# Patient Record
Sex: FEMALE | Race: WHITE | NOT HISPANIC OR LATINO | Employment: UNEMPLOYED | URBAN - METROPOLITAN AREA
[De-identification: names, ages, dates, MRNs, and addresses within clinical notes are randomized per-mention and may not be internally consistent; named-entity substitution may affect disease eponyms.]

---
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
## 2021-07-01 DIAGNOSIS — Z30.45 ENCOUNTER FOR SURVEILLANCE OF TRANSDERMAL PATCH HORMONAL CONTRACEPTIVE DEVICE: ICD-10-CM

## 2021-07-26 ENCOUNTER — OFFICE VISIT (OUTPATIENT)
Dept: URGENT CARE | Facility: CLINIC | Age: 21
End: 2021-07-26
Payer: COMMERCIAL

## 2021-07-26 VITALS
SYSTOLIC BLOOD PRESSURE: 104 MMHG | WEIGHT: 130 LBS | RESPIRATION RATE: 16 BRPM | TEMPERATURE: 97.3 F | HEART RATE: 92 BPM | DIASTOLIC BLOOD PRESSURE: 72 MMHG | BODY MASS INDEX: 23.03 KG/M2 | OXYGEN SATURATION: 97 %

## 2021-07-26 DIAGNOSIS — R11.11 NON-INTRACTABLE VOMITING WITHOUT NAUSEA, UNSPECIFIED VOMITING TYPE: Primary | ICD-10-CM

## 2021-07-26 PROCEDURE — 99214 OFFICE O/P EST MOD 30 MIN: CPT | Performed by: PHYSICIAN ASSISTANT

## 2021-07-26 PROCEDURE — S9088 SERVICES PROVIDED IN URGENT: HCPCS | Performed by: PHYSICIAN ASSISTANT

## 2021-07-26 RX ORDER — ONDANSETRON 4 MG/1
4 TABLET, ORALLY DISINTEGRATING ORAL EVERY 6 HOURS PRN
Qty: 20 TABLET | Refills: 0 | Status: SHIPPED | OUTPATIENT
Start: 2021-07-26 | End: 2022-03-29

## 2021-07-26 RX ORDER — DESVENLAFAXINE 100 MG/1
100 TABLET, EXTENDED RELEASE ORAL DAILY
COMMUNITY
Start: 2021-07-08 | End: 2022-02-14

## 2021-07-26 NOTE — PROGRESS NOTES
[facility]        NAME: Abbey Monsalve is a 22 y o  female  : 2000    MRN: [id number]  DATE: 2021  TIME: 4:11 PM    Assessment and Plan   Non-intractable vomiting without nausea, unspecified vomiting type [R11 11]  1  Non-intractable vomiting without nausea, unspecified vomiting type  ondansetron (ZOFRAN-ODT) 4 mg disintegrating tablet         Patient Instructions   Patient Instructions   zofran as needed   clear fluids as tolerated   Acute Nausea and Vomiting   WHAT YOU NEED TO KNOW:   Acute nausea and vomiting start suddenly, worsen quickly, and last a short time  DISCHARGE INSTRUCTIONS:   Return to the emergency department if:   · You see blood in your vomit or your bowel movements  · You have sudden, severe pain in your chest and upper abdomen after hard vomiting or retching  · You have swelling in your neck and chest      · You are dizzy, cold, and thirsty and your eyes and mouth are dry  · You are urinating very little or not at all  · You have muscle weakness, leg cramps, and trouble breathing  · Your heart is beating much faster than normal      · You continue to vomit for more than 48 hours  Contact your healthcare provider if:   · You have frequent dry heaves (vomiting but nothing comes out)  · Your nausea and vomiting does not get better or go away after you use medicine  · You have questions or concerns about your condition or treatment  Medicines: You may need any of the following:  · Medicines  may be given to calm your stomach and stop your vomiting  You may also need medicines to help you feel more relaxed or to stop nausea and vomiting caused by motion sickness  · Gastrointestinal stimulants  are used to help empty your stomach and bowels  This may help decrease nausea and vomiting  · Take your medicine as directed  Contact your healthcare provider if you think your medicine is not helping or if you have side effects   Tell him or her if you are allergic to any medicine  Keep a list of the medicines, vitamins, and herbs you take  Include the amounts, and when and why you take them  Bring the list or the pill bottles to follow-up visits  Carry your medicine list with you in case of an emergency  Prevent or manage acute nausea and vomiting:   · Do not drink alcohol  Alcohol may upset or irritate your stomach  Too much alcohol can also cause acute nausea and vomiting  · Control stress  Headaches due to stress may cause nausea and vomiting  Find ways to relax and manage your stress  Get more rest and sleep  · Drink more liquids as directed  Vomiting can lead to dehydration  It is important to drink more liquids to help replace lost body fluids  Ask your healthcare provider how much liquid to drink each day and which liquids are best for you  Your provider may recommend that you drink an oral rehydration solution (ORS)  ORS contains water, salts, and sugar that are needed to replace the lost body fluids  Ask what kind of ORS to use, how much to drink, and where to get it  · Eat smaller meals, more often  Eat small amounts of food every 2 to 3 hours, even if you are not hungry  Food in your stomach may decrease your nausea  · Talk to your healthcare provider before you take over-the-counter (OTC) medicines  These medicines can cause serious problems if you use certain other medicines, or you have a medical condition  You may have problems if you use too much or use them for longer than the label says  Follow directions on the label carefully  Follow up with your healthcare provider as directed:  Write down your questions so you remember to ask them during your follow-up visits  © Copyright [employer] 2021 Information is for End User's use only and may not be sold, redistributed or otherwise used for commercial purposes   All illustrations and images included in CareNotes® are the copyrighted property of [employer]  or [employer] [facility]  The above information is an  only  It is not intended as medical advice for individual conditions or treatments  Talk to your doctor, nurse or pharmacist before following any medical regimen to see if it is safe and effective for you  Follow up with PCP in 3-5 days  Proceed to  ER if symptoms worsen  Chief Complaint     Chief Complaint   Patient presents with    Vomiting     started last night after dinner, about 6 episodes  Also c/o headache  No diarrhea         History of Present Illness       The patient is a 20-year-old female presenting with vomiting  It began last night after dinner  She has vomited 3 times  Nonbloody  Last episode was at 9 this morning  She also has a headache  No diarrhea  Mild nausea  She believes it was the pizza rolls she ate  After the pizza rolls she ate chicken with her whole family and nobody else is sick  Review of Systems   Review of Systems   Constitutional: Negative for activity change, appetite change, chills, fatigue and fever  HENT: Negative for congestion, rhinorrhea, sinus pressure, sinus pain and sore throat  Respiratory: Negative for cough, chest tightness and shortness of breath  Cardiovascular: Negative for chest pain and palpitations  Gastrointestinal: Positive for nausea and vomiting  Negative for abdominal distention, abdominal pain, anal bleeding, blood in stool, constipation and diarrhea  Musculoskeletal: Negative for arthralgias and myalgias  Skin: Negative for color change and pallor  Neurological: Positive for headaches           Current Medications       Current Outpatient Medications:     desvenlafaxine (PRISTIQ) 100 mg 24 hr tablet, Take 100 mg by mouth daily, Disp: , Rfl:     norelgestromin-ethinyl estradiol (ORTHO EVRA) 150-35 MCG/24HR, Place 1 patch on the skin once a week, Disp: 3 patch, Rfl: 10    albuterol (VENTOLIN HFA) 90 mcg/act inhaler, Inhale 2 puffs every 4 (four) hours as needed for wheezing (Patient not taking: Reported on 2019), Disp: 1 Inhaler, Rfl: 0    ALPRAZolam (XANAX) 0 25 mg tablet, Take by mouth daily at bedtime as needed for anxiety (Patient not taking: Reported on 2021), Disp: , Rfl:     desvenlafaxine succinate (PRISTIQ) 50 mg 24 hr tablet, Take 1 tablet (50 mg total) by mouth daily, Disp: 30 tablet, Rfl:     etonogestrel (Nexplanon) subdermal implant, 68 mg by Subdermal route once (Patient not taking: Reported on 2021), Disp: , Rfl:     hydrOXYzine HCL (ATARAX) 50 mg tablet, Take 50 mg by mouth 3 (three) times a day as needed for itching (PRN with panic attack), Disp: , Rfl:     norelgestromin-ethinyl estradiol (ORTHO EVRA) 150-35 MCG/24HR, Place 1 patch on the skin once a week, Disp: 3 patch, Rfl: 3    ondansetron (ZOFRAN-ODT) 4 mg disintegrating tablet, Take 1 tablet (4 mg total) by mouth every 6 (six) hours as needed for nausea or vomiting, Disp: 20 tablet, Rfl: 0    Current Allergies     Allergies as of 2021 - Reviewed 2021   Allergen Reaction Noted    Amoxicillin-pot clavulanate GI Intolerance 2018    Molds & smuts  2014    Pollen extract  2014            The following portions of the patient's history were reviewed and updated as appropriate: allergies, current medications, past family history, past medical history, past social history, past surgical history and problem list      Past Medical History:   Diagnosis Date    Anxiety     Depression     Scoliosis     Trichotillomania        Past Surgical History:   Procedure Laterality Date    NO PAST SURGERIES      THERAPEUTIC       Non Surgery- Pill Form       Family History   Problem Relation Age of Onset    Mental illness Mother     Diabetes Father     No Known Problems Sister     Hypertension Maternal Grandmother     Alcohol abuse Maternal Grandfather     Mental illness Paternal Grandmother     Mental illness Paternal Grandfather Medications have been verified  Objective   /72   Pulse 92   Temp (!) 97 3 °F (36 3 °C) (Temporal)   Resp 16   Wt 59 kg (130 lb)   LMP 07/25/2021   SpO2 97%   BMI 23 03 kg/m²        Physical Exam     Physical Exam  Vitals reviewed  Constitutional:       General: She is not in acute distress  Appearance: Normal appearance  She is normal weight  She is not ill-appearing, toxic-appearing or diaphoretic  HENT:      Head: Normocephalic and atraumatic  Cardiovascular:      Rate and Rhythm: Normal rate and regular rhythm  Heart sounds: Normal heart sounds  No murmur heard  No friction rub  No gallop  Pulmonary:      Effort: Pulmonary effort is normal  No respiratory distress  Breath sounds: Normal breath sounds  No stridor  No wheezing, rhonchi or rales  Chest:      Chest wall: No tenderness  Abdominal:      General: Abdomen is flat  Bowel sounds are normal  There is no distension  Palpations: Abdomen is soft  There is no mass  Tenderness: There is no abdominal tenderness  There is no right CVA tenderness, left CVA tenderness, guarding or rebound  Hernia: No hernia is present  Musculoskeletal:         General: Normal range of motion  Skin:     General: Skin is warm and dry  Capillary Refill: Capillary refill takes less than 2 seconds  Neurological:      Mental Status: She is alert

## 2021-07-26 NOTE — LETTER
July 26, 2021     Patient: Jami Stewart   YOB: 2000   Date of Visit: 7/26/2021       To Whom it May Concern:    Omar Goodwin is under my professional care  She was seen in my office on 7/26/2021  She may return to work 7/27/2021  If you have any questions or concerns, please don't hesitate to call           Sincerely,          Kianna Acevedo PA-C        CC: No Recipients

## 2021-07-26 NOTE — PATIENT INSTRUCTIONS
zofran as needed   clear fluids as tolerated   Acute Nausea and Vomiting   WHAT YOU NEED TO KNOW:   Acute nausea and vomiting start suddenly, worsen quickly, and last a short time  DISCHARGE INSTRUCTIONS:   Return to the emergency department if:   · You see blood in your vomit or your bowel movements  · You have sudden, severe pain in your chest and upper abdomen after hard vomiting or retching  · You have swelling in your neck and chest      · You are dizzy, cold, and thirsty and your eyes and mouth are dry  · You are urinating very little or not at all  · You have muscle weakness, leg cramps, and trouble breathing  · Your heart is beating much faster than normal      · You continue to vomit for more than 48 hours  Contact your healthcare provider if:   · You have frequent dry heaves (vomiting but nothing comes out)  · Your nausea and vomiting does not get better or go away after you use medicine  · You have questions or concerns about your condition or treatment  Medicines: You may need any of the following:  · Medicines  may be given to calm your stomach and stop your vomiting  You may also need medicines to help you feel more relaxed or to stop nausea and vomiting caused by motion sickness  · Gastrointestinal stimulants  are used to help empty your stomach and bowels  This may help decrease nausea and vomiting  · Take your medicine as directed  Contact your healthcare provider if you think your medicine is not helping or if you have side effects  Tell him or her if you are allergic to any medicine  Keep a list of the medicines, vitamins, and herbs you take  Include the amounts, and when and why you take them  Bring the list or the pill bottles to follow-up visits  Carry your medicine list with you in case of an emergency  Prevent or manage acute nausea and vomiting:   · Do not drink alcohol  Alcohol may upset or irritate your stomach   Too much alcohol can also cause acute nausea and vomiting  · Control stress  Headaches due to stress may cause nausea and vomiting  Find ways to relax and manage your stress  Get more rest and sleep  · Drink more liquids as directed  Vomiting can lead to dehydration  It is important to drink more liquids to help replace lost body fluids  Ask your healthcare provider how much liquid to drink each day and which liquids are best for you  Your provider may recommend that you drink an oral rehydration solution (ORS)  ORS contains water, salts, and sugar that are needed to replace the lost body fluids  Ask what kind of ORS to use, how much to drink, and where to get it  · Eat smaller meals, more often  Eat small amounts of food every 2 to 3 hours, even if you are not hungry  Food in your stomach may decrease your nausea  · Talk to your healthcare provider before you take over-the-counter (OTC) medicines  These medicines can cause serious problems if you use certain other medicines, or you have a medical condition  You may have problems if you use too much or use them for longer than the label says  Follow directions on the label carefully  Follow up with your healthcare provider as directed:  Write down your questions so you remember to ask them during your follow-up visits  © Copyright [employer] 2021 Information is for End User's use only and may not be sold, redistributed or otherwise used for commercial purposes  All illustrations and images included in CareNotes® are the copyrighted property of [employer]  or Wil[employer] Ren[employer]  The above information is an  only  It is not intended as medical advice for individual conditions or treatments  Talk to your doctor, nurse or pharmacist before following any medical regimen to see if it is safe and effective for you

## 2021-08-27 ENCOUNTER — OFFICE VISIT (OUTPATIENT)
Dept: FAMILY MEDICINE CLINIC | Facility: CLINIC | Age: 21
End: 2021-08-27
Payer: COMMERCIAL

## 2021-08-27 VITALS
OXYGEN SATURATION: 99 % | BODY MASS INDEX: 23.74 KG/M2 | TEMPERATURE: 97.8 F | WEIGHT: 129 LBS | HEIGHT: 62 IN | SYSTOLIC BLOOD PRESSURE: 104 MMHG | DIASTOLIC BLOOD PRESSURE: 78 MMHG | HEART RATE: 104 BPM

## 2021-08-27 DIAGNOSIS — F39 MOOD DISORDER (HCC): ICD-10-CM

## 2021-08-27 DIAGNOSIS — Z00.00 HEALTHCARE MAINTENANCE: Primary | ICD-10-CM

## 2021-08-27 DIAGNOSIS — F32.A DEPRESSION, UNSPECIFIED DEPRESSION TYPE: ICD-10-CM

## 2021-08-27 PROCEDURE — 1036F TOBACCO NON-USER: CPT | Performed by: PHYSICIAN ASSISTANT

## 2021-08-27 PROCEDURE — 99385 PREV VISIT NEW AGE 18-39: CPT | Performed by: PHYSICIAN ASSISTANT

## 2021-08-27 PROCEDURE — 3008F BODY MASS INDEX DOCD: CPT | Performed by: PHYSICIAN ASSISTANT

## 2021-08-27 PROCEDURE — 3725F SCREEN DEPRESSION PERFORMED: CPT | Performed by: PHYSICIAN ASSISTANT

## 2021-08-27 RX ORDER — ESCITALOPRAM OXALATE 10 MG/1
10 TABLET ORAL DAILY
COMMUNITY
End: 2022-02-14

## 2021-08-27 NOTE — PROGRESS NOTES
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[place] [address]   Pt is being managed by psych at [facility] taking pristiq and lexapro for depression, mood disorder and PTSD        Immunizations and preventive care screenings were discussed with patient today  Appropriate education was printed on patient's after visit summary  Counseling:  · Exercise: the importance of regular exercise/physical activity was discussed  Recommend exercise 3-5 times per week for at least 30 minutes  Return in 1 year (on 2022)  Chief Complaint:     Chief Complaint   Patient presents with    Establish Care     physical for nursing school      History of Present Illness:     Adult Annual Physical   Patient here for a comprehensive physical exam  The patient reports no problems  Diet and Physical Activity  · Diet/Nutrition: well balanced diet  · Exercise: no formal exercise  Depression Screening  PHQ-9 Depression Screening    PHQ-9:   Frequency of the following problems over the past two weeks:      Little interest or pleasure in doing things: 0 - not at all  Feeling down, depressed, or hopeless: 0 - not at all  PHQ-2 Score: 0       General Health  · Sleep: sleeps well  · Hearing: normal - bilateral   · Vision: no vision problems  · Dental: regular dental visits         /GYN Health  · Last menstrual period: 1 week ago  On OCP  Has not had cervical screening     Review of Systems:     Review of Systems   Constitutional: Negative for chills, fatigue and fever  HENT: Negative for congestion, ear pain, sinus pain, sore throat and trouble swallowing  Eyes: Negative for pain, discharge and redness  Respiratory: Negative for cough, chest tightness, shortness of breath and wheezing  Cardiovascular: Negative for chest pain, palpitations and leg swelling  Gastrointestinal: Negative for abdominal pain, diarrhea, nausea and vomiting  Musculoskeletal: Negative for arthralgias, joint swelling and myalgias  Skin: Negative for rash  Neurological: Negative for dizziness, weakness, numbness and headaches        Past Medical History:     Past Medical History:   Diagnosis Date    Anxiety     Depression     Scoliosis     Trichotillomania       Past Surgical History:     Past Surgical History:   Procedure Laterality Date    NO PAST SURGERIES      THERAPEUTIC       Non Surgery- Pill Form      Social History:     Social History     Socioeconomic History    Marital status: Single     Spouse name: None    Number of children: None    Years of education: None    Highest education level: None   Occupational History    None   Tobacco Use    Smoking status: Passive Smoke Exposure - Never Smoker    Smokeless tobacco: Never Used   Vaping Use    Vaping Use: Never used   Substance and Sexual Activity    Alcohol use: No    Drug use: No    Sexual activity: Yes     Partners: Female, Male     Birth control/protection: Condom Male     Comment: male currently    Other Topics Concern    None   Social History Narrative    Pt doesn't know her cell phone number so you may contact mom      Social Determinants of Health     Financial Resource Strain:     Difficulty of Paying Living Expenses:    Food Insecurity:     Worried About Running Out of Food in the Last Year:     Ran Out of Food in the Last Year:    Transportation Needs:     Lack of Transportation (Medical):      Lack of Transportation (Non-Medical):    Physical Activity:     Days of Exercise per Week:     Minutes of Exercise per Session:    Stress:     Feeling of Stress :    Social Connections:     Frequency of Communication with Friends and Family:     Frequency of Social Gatherings with Friends and Family:     Attends Hinduism Services:     Active Member of Clubs or Organizations:     Attends Club or Organization Meetings:     Marital Status:    Intimate Partner Violence:     Fear of Current or Ex-Partner:     Emotionally Abused:     Physically Abused:     Sexually Abused:       Family History:     Family History   Problem Relation Age of Onset    Mental illness Mother     Diabetes Father     No Known Problems Sister     Hypertension Maternal Grandmother     Alcohol abuse Maternal Grandfather     Mental illness Paternal Grandmother     Mental illness Paternal Grandfather       Current Medications:     Current Outpatient Medications   Medication Sig Dispense Refill    desvenlafaxine succinate (PRISTIQ) 50 mg 24 hr tablet Take 1 tablet (50 mg total) by mouth daily 30 tablet     escitalopram (LEXAPRO) 10 mg tablet Take 10 mg by mouth daily      norelgestromin-ethinyl estradiol (ORTHO EVRA) 150-35 MCG/24HR Place 1 patch on the skin once a week 3 patch 10    albuterol (VENTOLIN HFA) 90 mcg/act inhaler Inhale 2 puffs every 4 (four) hours as needed for wheezing (Patient not taking: Reported on 5/24/2019) 1 Inhaler 0    ALPRAZolam (XANAX) 0 25 mg tablet Take by mouth daily at bedtime as needed for anxiety (Patient not taking: Reported on 7/26/2021)      desvenlafaxine (PRISTIQ) 100 mg 24 hr tablet Take 100 mg by mouth daily (Patient not taking: Reported on 8/27/2021)      etonogestrel (Nexplanon) subdermal implant 68 mg by Subdermal route once (Patient not taking: Reported on 7/26/2021)      hydrOXYzine HCL (ATARAX) 50 mg tablet Take 50 mg by mouth 3 (three) times a day as needed for itching (PRN with panic attack) (Patient not taking: Reported on 8/27/2021)      norelgestromin-ethinyl estradiol (ORTHO EVRA) 150-35 MCG/24HR Place 1 patch on the skin once a week (Patient not taking: Reported on 8/27/2021) 3 patch 3    ondansetron (ZOFRAN-ODT) 4 mg disintegrating tablet Take 1 tablet (4 mg total) by mouth every 6 (six) hours as needed for nausea or vomiting (Patient not taking: Reported on 8/27/2021) 20 tablet 0     No current facility-administered medications for this visit  Allergies: Allergies   Allergen Reactions    Amoxicillin-Pot Clavulanate GI Intolerance    Fruit & Vegetable Daily [Fish Oil - Food Allergy] GI Intolerance     Grape fruit    Molds & Smuts     Pollen Extract       Physical Exam:     /78 (BP Location: Left arm, Patient Position: Sitting, Cuff Size: Large)   Pulse 104   Temp 97 8 °F (36 6 °C)   Ht 5' 2" (1 575 m)   Wt 58 5 kg (129 lb)   LMP 08/16/2021   SpO2 99%   BMI 23 59 kg/m²     Physical Exam  Vitals and nursing note reviewed  Constitutional:       General: She is not in acute distress  Appearance: She is well-developed  HENT:      Head: Normocephalic and atraumatic  Eyes:      Conjunctiva/sclera: Conjunctivae normal    Cardiovascular:      Rate and Rhythm: Normal rate and regular rhythm  Heart sounds: No murmur heard  Pulmonary:      Effort: Pulmonary effort is normal  No respiratory distress  Breath sounds: Normal breath sounds  Abdominal:      Palpations: Abdomen is soft  Tenderness: There is no abdominal tenderness  Musculoskeletal:      Cervical back: Neck supple  Skin:     General: Skin is warm and dry  Neurological:      Mental Status: She is alert            Emily Griffin PA-C   [address] [place] [address] [phone]

## 2021-08-27 NOTE — PATIENT INSTRUCTIONS

## 2021-09-02 ENCOUNTER — VBI (OUTPATIENT)
Dept: ADMINISTRATIVE | Facility: OTHER | Age: 21
End: 2021-09-02

## 2021-09-16 ENCOUNTER — TELEPHONE (OUTPATIENT)
Dept: OTHER | Facility: OTHER | Age: 21
End: 2021-09-16

## 2021-09-20 ENCOUNTER — CLINICAL SUPPORT (OUTPATIENT)
Dept: FAMILY MEDICINE CLINIC | Facility: CLINIC | Age: 21
End: 2021-09-20
Payer: COMMERCIAL

## 2021-09-20 ENCOUNTER — APPOINTMENT (OUTPATIENT)
Dept: LAB | Facility: CLINIC | Age: 21
End: 2021-09-20
Payer: COMMERCIAL

## 2021-09-20 ENCOUNTER — TELEPHONE (OUTPATIENT)
Dept: FAMILY MEDICINE CLINIC | Facility: CLINIC | Age: 21
End: 2021-09-20

## 2021-09-20 DIAGNOSIS — Z01.84 ENCOUNTER FOR ANTIBODY RESPONSE EXAMINATION: ICD-10-CM

## 2021-09-20 DIAGNOSIS — Z11.59 NEED FOR HEPATITIS B SCREENING TEST: ICD-10-CM

## 2021-09-20 DIAGNOSIS — Z11.1 PPD SCREENING TEST: Primary | ICD-10-CM

## 2021-09-20 LAB
HBV SURFACE AB SER-ACNC: 31.78 MIU/ML
RUBV IGG SERPL IA-ACNC: 159.3 IU/ML

## 2021-09-20 PROCEDURE — 86787 VARICELLA-ZOSTER ANTIBODY: CPT

## 2021-09-20 PROCEDURE — 36415 COLL VENOUS BLD VENIPUNCTURE: CPT

## 2021-09-20 PROCEDURE — 86580 TB INTRADERMAL TEST: CPT

## 2021-09-20 PROCEDURE — 86762 RUBELLA ANTIBODY: CPT

## 2021-09-20 PROCEDURE — 86706 HEP B SURFACE ANTIBODY: CPT

## 2021-09-20 PROCEDURE — 86735 MUMPS ANTIBODY: CPT

## 2021-09-20 PROCEDURE — 86765 RUBEOLA ANTIBODY: CPT
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
## 2021-09-20 NOTE — TELEPHONE ENCOUNTER
She has no complaints of pain that I am aware of, she was here for her PPD and then needed a bunch of titers ordered and then also asked for the note as well

## 2021-09-21 LAB — VZV IGG SER IA-ACNC: ABNORMAL

## 2021-09-22 ENCOUNTER — OFFICE VISIT (OUTPATIENT)
Dept: FAMILY MEDICINE CLINIC | Facility: CLINIC | Age: 21
End: 2021-09-22
Payer: COMMERCIAL

## 2021-09-22 VITALS
DIASTOLIC BLOOD PRESSURE: 72 MMHG | WEIGHT: 129 LBS | HEART RATE: 112 BPM | SYSTOLIC BLOOD PRESSURE: 116 MMHG | HEIGHT: 62 IN | RESPIRATION RATE: 18 BRPM | BODY MASS INDEX: 23.74 KG/M2 | OXYGEN SATURATION: 99 %

## 2021-09-22 DIAGNOSIS — M79.605 LEFT LEG PAIN: Primary | ICD-10-CM

## 2021-09-22 LAB
INDURATION: 0 MM
TB SKIN TEST: NEGATIVE

## 2021-09-22 PROCEDURE — 99213 OFFICE O/P EST LOW 20 MIN: CPT | Performed by: PHYSICIAN ASSISTANT

## 2021-09-22 PROCEDURE — 3008F BODY MASS INDEX DOCD: CPT | Performed by: PHYSICIAN ASSISTANT

## 2021-09-22 PROCEDURE — 1036F TOBACCO NON-USER: CPT | Performed by: PHYSICIAN ASSISTANT

## 2021-09-22 NOTE — LETTER
September 22, 2021     Patient: Analy Harmon   YOB: 2000   Date of Visit: 9/22/2021       To Whom it May Concern:    Keyana Vieira is under my professional care  She was seen in my office on 9/22/2021  Please excuse patient 9/18, 9/19 and 9/20  Patient may return to work  If you have any questions or concerns, please don't hesitate to call           Sincerely,          Heena Griffin PA-C        CC: No Recipients
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
## 2021-09-23 LAB
MEV IGG SER QL: NORMAL
MUV IGG SER QL: NORMAL

## 2021-09-27 ENCOUNTER — CLINICAL SUPPORT (OUTPATIENT)
Dept: FAMILY MEDICINE CLINIC | Facility: CLINIC | Age: 21
End: 2021-09-27
Payer: COMMERCIAL

## 2021-09-27 DIAGNOSIS — Z11.1 SCREENING FOR TUBERCULOSIS: Primary | ICD-10-CM

## 2021-09-27 PROCEDURE — 86580 TB INTRADERMAL TEST: CPT

## 2021-09-29 LAB
INDURATION: 0 MM
TB SKIN TEST: NEGATIVE

## 2021-10-22 ENCOUNTER — VBI (OUTPATIENT)
Dept: ADMINISTRATIVE | Facility: OTHER | Age: 21
End: 2021-10-22

## 2021-10-25 ENCOUNTER — CLINICAL SUPPORT (OUTPATIENT)
Dept: FAMILY MEDICINE CLINIC | Facility: CLINIC | Age: 21
End: 2021-10-25
Payer: COMMERCIAL

## 2021-10-25 DIAGNOSIS — Z23 NEED FOR VARICELLA VACCINE: Primary | ICD-10-CM

## 2021-10-25 PROCEDURE — 90471 IMMUNIZATION ADMIN: CPT

## 2021-10-25 PROCEDURE — 90716 VAR VACCINE LIVE SUBQ: CPT

## 2021-11-16 DIAGNOSIS — Z30.45 ENCOUNTER FOR SURVEILLANCE OF TRANSDERMAL PATCH HORMONAL CONTRACEPTIVE DEVICE: Primary | ICD-10-CM

## 2021-11-16 RX ORDER — NORELGESTROMIN AND ETHINYL ESTRADIOL 150; 35 UG/D; UG/D
PATCH TRANSDERMAL
Qty: 3 PATCH | Refills: 3 | Status: SHIPPED | OUTPATIENT
Start: 2021-11-16 | End: 2022-03-10

## 2021-12-22 ENCOUNTER — TELEPHONE (OUTPATIENT)
Dept: OBGYN CLINIC | Facility: CLINIC | Age: 21
End: 2021-12-22

## 2022-02-09 ENCOUNTER — TELEPHONE (OUTPATIENT)
Dept: FAMILY MEDICINE CLINIC | Facility: CLINIC | Age: 22
End: 2022-02-09

## 2022-02-09 NOTE — TELEPHONE ENCOUNTER
Patient called states she only has one pill left of her prystiq prescription  Her psychiatrist no longer takes her insurance and she is wondering if it can be filled this one time till she can find a provider

## 2022-02-14 NOTE — TELEPHONE ENCOUNTER
Mili with [facility] and she takes Prestiq 100 mg once a day to [employer] on [facility]

## 2022-03-29 ENCOUNTER — OFFICE VISIT (OUTPATIENT)
Dept: FAMILY MEDICINE CLINIC | Facility: CLINIC | Age: 22
End: 2022-03-29
Payer: COMMERCIAL

## 2022-03-29 VITALS
TEMPERATURE: 97.3 F | SYSTOLIC BLOOD PRESSURE: 116 MMHG | OXYGEN SATURATION: 97 % | HEIGHT: 62 IN | WEIGHT: 137 LBS | HEART RATE: 102 BPM | BODY MASS INDEX: 25.21 KG/M2 | DIASTOLIC BLOOD PRESSURE: 70 MMHG

## 2022-03-29 DIAGNOSIS — R11.2 NAUSEA AND VOMITING, INTRACTABILITY OF VOMITING NOT SPECIFIED, UNSPECIFIED VOMITING TYPE: ICD-10-CM
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
## 2022-03-29 DIAGNOSIS — R50.9 FEVER, UNSPECIFIED FEVER CAUSE: Primary | ICD-10-CM

## 2022-03-29 PROCEDURE — 1036F TOBACCO NON-USER: CPT | Performed by: PHYSICIAN ASSISTANT

## 2022-03-29 PROCEDURE — 99214 OFFICE O/P EST MOD 30 MIN: CPT | Performed by: PHYSICIAN ASSISTANT

## 2022-03-29 PROCEDURE — 87636 SARSCOV2 & INF A&B AMP PRB: CPT | Performed by: PHYSICIAN ASSISTANT

## 2022-03-29 PROCEDURE — 3008F BODY MASS INDEX DOCD: CPT | Performed by: PHYSICIAN ASSISTANT

## 2022-03-29 RX ORDER — ONDANSETRON 4 MG/1
4 TABLET, FILM COATED ORAL EVERY 8 HOURS PRN
Qty: 20 TABLET | Refills: 0 | Status: SHIPPED | OUTPATIENT
Start: 2022-03-29

## 2022-03-29 RX ORDER — DOXYCYCLINE 100 MG/1
100 TABLET ORAL 2 TIMES DAILY
Qty: 14 TABLET | Refills: 0 | Status: SHIPPED | OUTPATIENT
Start: 2022-03-29 | End: 2022-04-05

## 2022-03-29 NOTE — LETTER
March 29, 2022     Patient: Romina Goodman   YOB: 2000   Date of Visit: 3/29/2022       To Whom it May Concern:    Gaurav Bustos is under my professional care  She was seen in my office on 3/29/2022  If you have any questions or concerns, please don't hesitate to call           Sincerely,          Corazon Griffin PA-C        CC: No Recipients
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
## 2022-03-30 LAB
FLUAV RNA RESP QL NAA+PROBE: NEGATIVE
FLUBV RNA RESP QL NAA+PROBE: NEGATIVE
SARS-COV-2 RNA RESP QL NAA+PROBE: NEGATIVE

## 2022-04-01 ENCOUNTER — TELEPHONE (OUTPATIENT)
Dept: OTHER | Facility: OTHER | Age: 22
End: 2022-04-01

## 2022-04-01 ENCOUNTER — OFFICE VISIT (OUTPATIENT)
Dept: OBGYN CLINIC | Facility: CLINIC | Age: 22
End: 2022-04-01
Payer: COMMERCIAL

## 2022-04-01 VITALS
SYSTOLIC BLOOD PRESSURE: 116 MMHG | DIASTOLIC BLOOD PRESSURE: 70 MMHG | BODY MASS INDEX: 25.28 KG/M2 | WEIGHT: 137.4 LBS | HEIGHT: 62 IN

## 2022-04-01 DIAGNOSIS — N76.0 ACUTE VAGINITIS: Primary | ICD-10-CM

## 2022-04-01 DIAGNOSIS — Z11.3 SCREENING EXAMINATION FOR STD (SEXUALLY TRANSMITTED DISEASE): ICD-10-CM

## 2022-04-01 DIAGNOSIS — Z11.3 SCREENING EXAMINATION FOR VENEREAL DISEASE: ICD-10-CM

## 2022-04-01 PROCEDURE — 87480 CANDIDA DNA DIR PROBE: CPT | Performed by: PHYSICIAN ASSISTANT

## 2022-04-01 PROCEDURE — 87660 TRICHOMONAS VAGIN DIR PROBE: CPT | Performed by: PHYSICIAN ASSISTANT

## 2022-04-01 PROCEDURE — 87591 N.GONORRHOEAE DNA AMP PROB: CPT | Performed by: PHYSICIAN ASSISTANT

## 2022-04-01 PROCEDURE — 1036F TOBACCO NON-USER: CPT | Performed by: PHYSICIAN ASSISTANT

## 2022-04-01 PROCEDURE — 87510 GARDNER VAG DNA DIR PROBE: CPT | Performed by: PHYSICIAN ASSISTANT

## 2022-04-01 PROCEDURE — 99213 OFFICE O/P EST LOW 20 MIN: CPT | Performed by: PHYSICIAN ASSISTANT

## 2022-04-01 PROCEDURE — 3008F BODY MASS INDEX DOCD: CPT | Performed by: PHYSICIAN ASSISTANT

## 2022-04-01 PROCEDURE — 87491 CHLMYD TRACH DNA AMP PROBE: CPT | Performed by: PHYSICIAN ASSISTANT

## 2022-04-01 RX ORDER — FLUCONAZOLE 150 MG/1
TABLET ORAL
COMMUNITY
Start: 2021-12-31 | End: 2022-04-01

## 2022-04-01 RX ORDER — FLUCONAZOLE 150 MG/1
150 TABLET ORAL ONCE
Qty: 1 TABLET | Refills: 0 | Status: SHIPPED | OUTPATIENT
Start: 2022-04-01 | End: 2022-04-01

## 2022-04-01 NOTE — TELEPHONE ENCOUNTER
Recommend plenty of fluids   Alternate tylenol and motrin for fever  If continuing or worsening symptoms needs to be reevaluated  She is on abx  Swabs were negative for flu/covid
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
## 2022-04-02 LAB
C TRACH DNA SPEC QL NAA+PROBE: NEGATIVE
N GONORRHOEA DNA SPEC QL NAA+PROBE: NEGATIVE

## 2022-04-03 LAB
CANDIDA RRNA VAG QL PROBE: POSITIVE
G VAGINALIS RRNA GENITAL QL PROBE: POSITIVE
T VAGINALIS RRNA GENITAL QL PROBE: NEGATIVE

## 2022-04-19 ENCOUNTER — TELEPHONE (OUTPATIENT)
Dept: OBGYN CLINIC | Facility: CLINIC | Age: 22
End: 2022-04-19

## 2022-04-19 ENCOUNTER — APPOINTMENT (OUTPATIENT)
Dept: LAB | Age: 22
End: 2022-04-19
Payer: COMMERCIAL

## 2022-04-19 DIAGNOSIS — O20.0 THREATENED ABORTION: ICD-10-CM

## 2022-04-19 DIAGNOSIS — O20.0 THREATENED ABORTION: Primary | ICD-10-CM

## 2022-04-19 LAB
ABO GROUP BLD: NORMAL
B-HCG SERPL-ACNC: <2 MIU/ML
RH BLD: POSITIVE

## 2022-04-19 PROCEDURE — 84702 CHORIONIC GONADOTROPIN TEST: CPT

## 2022-04-19 PROCEDURE — 86901 BLOOD TYPING SEROLOGIC RH(D): CPT

## 2022-04-19 PROCEDURE — 36415 COLL VENOUS BLD VENIPUNCTURE: CPT

## 2022-04-19 PROCEDURE — 86900 BLOOD TYPING SEROLOGIC ABO: CPT

## 2022-04-19 NOTE — TELEPHONE ENCOUNTER
LMP 4/7/2022, bleeding and took home ept test and is positive ,  Wants to terminate pregnancy, does bot know her blood type, she would like  Blood work  Done  asap, please advise

## 2022-04-28 ENCOUNTER — VBI (OUTPATIENT)
Dept: ADMINISTRATIVE | Facility: OTHER | Age: 22
End: 2022-04-28

## 2022-05-23 ENCOUNTER — ANNUAL EXAM (OUTPATIENT)
Dept: OBGYN CLINIC | Facility: CLINIC | Age: 22
End: 2022-05-23
Payer: COMMERCIAL

## 2022-05-23 VITALS
DIASTOLIC BLOOD PRESSURE: 82 MMHG | HEIGHT: 62 IN | SYSTOLIC BLOOD PRESSURE: 122 MMHG | WEIGHT: 136 LBS | BODY MASS INDEX: 25.03 KG/M2

## 2022-05-23 DIAGNOSIS — N76.0 ACUTE VAGINITIS: ICD-10-CM

## 2022-05-23 DIAGNOSIS — Z12.4 SCREENING FOR CERVICAL CANCER: ICD-10-CM

## 2022-05-23 DIAGNOSIS — Z11.3 SCREEN FOR STD (SEXUALLY TRANSMITTED DISEASE): ICD-10-CM

## 2022-05-23 DIAGNOSIS — N89.8 VAGINAL DISCHARGE: ICD-10-CM

## 2022-05-23 DIAGNOSIS — Z01.419 ENCOUNTER FOR GYNECOLOGICAL EXAMINATION WITHOUT ABNORMAL FINDING: Primary | ICD-10-CM

## 2022-05-23 DIAGNOSIS — Z72.53 HIGH RISK BISEXUAL BEHAVIOR: ICD-10-CM

## 2022-05-23 PROCEDURE — 99395 PREV VISIT EST AGE 18-39: CPT | Performed by: PHYSICIAN ASSISTANT

## 2022-05-23 PROCEDURE — 87591 N.GONORRHOEAE DNA AMP PROB: CPT | Performed by: PHYSICIAN ASSISTANT

## 2022-05-23 PROCEDURE — 3008F BODY MASS INDEX DOCD: CPT | Performed by: PHYSICIAN ASSISTANT

## 2022-05-23 PROCEDURE — 87491 CHLMYD TRACH DNA AMP PROBE: CPT | Performed by: PHYSICIAN ASSISTANT

## 2022-05-23 PROCEDURE — 1036F TOBACCO NON-USER: CPT | Performed by: PHYSICIAN ASSISTANT

## 2022-05-23 PROCEDURE — G0145 SCR C/V CYTO,THINLAYER,RESCR: HCPCS | Performed by: PHYSICIAN ASSISTANT
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
## 2022-05-25 DIAGNOSIS — A74.9 CHLAMYDIA INFECTION: Primary | ICD-10-CM

## 2022-05-25 LAB
C TRACH DNA SPEC QL NAA+PROBE: POSITIVE
CANDIDA RRNA VAG QL PROBE: NEGATIVE
G VAGINALIS RRNA GENITAL QL PROBE: NEGATIVE
N GONORRHOEA DNA SPEC QL NAA+PROBE: NEGATIVE
T VAGINALIS RRNA GENITAL QL PROBE: NEGATIVE

## 2022-05-25 RX ORDER — DOXYCYCLINE HYCLATE 100 MG/1
100 CAPSULE ORAL EVERY 12 HOURS SCHEDULED
Qty: 14 CAPSULE | Refills: 0 | Status: SHIPPED | OUTPATIENT
Start: 2022-05-25 | End: 2022-06-01

## 2022-05-31 LAB
LAB AP GYN PRIMARY INTERPRETATION: NORMAL
Lab: NORMAL

## 2022-06-02 DIAGNOSIS — T36.95XA ANTIBIOTIC-INDUCED YEAST INFECTION: Primary | ICD-10-CM

## 2022-06-02 DIAGNOSIS — B37.9 ANTIBIOTIC-INDUCED YEAST INFECTION: Primary | ICD-10-CM

## 2022-06-02 RX ORDER — FLUCONAZOLE 150 MG/1
TABLET ORAL
Qty: 2 TABLET | Refills: 0 | Status: SHIPPED | OUTPATIENT
Start: 2022-06-02 | End: 2022-06-05

## 2022-07-06 DIAGNOSIS — B37.9 YEAST INFECTION: Primary | ICD-10-CM

## 2022-07-06 RX ORDER — FLUCONAZOLE 150 MG/1
150 TABLET ORAL ONCE
COMMUNITY
End: 2022-07-06 | Stop reason: SDUPTHER

## 2022-07-07 RX ORDER — FLUCONAZOLE 150 MG/1
150 TABLET ORAL ONCE
Qty: 2 TABLET | Refills: 0 | Status: SHIPPED | OUTPATIENT
Start: 2022-07-07 | End: 2022-07-07

## 2022-07-11 DIAGNOSIS — B37.31 VAGINAL YEAST INFECTION: Primary | ICD-10-CM

## 2022-07-11 RX ORDER — FLUCONAZOLE 150 MG/1
150 TABLET ORAL ONCE
Qty: 2 TABLET | Refills: 0 | Status: SHIPPED | OUTPATIENT
Start: 2022-07-11 | End: 2022-07-11

## 2022-07-25 ENCOUNTER — OFFICE VISIT (OUTPATIENT)
Dept: URGENT CARE | Facility: CLINIC | Age: 22
End: 2022-07-25
Payer: COMMERCIAL

## 2022-07-25 VITALS
HEART RATE: 94 BPM | HEIGHT: 62 IN | DIASTOLIC BLOOD PRESSURE: 84 MMHG | TEMPERATURE: 98.4 F | RESPIRATION RATE: 17 BRPM | SYSTOLIC BLOOD PRESSURE: 134 MMHG | BODY MASS INDEX: 24.84 KG/M2 | OXYGEN SATURATION: 97 % | WEIGHT: 135 LBS

## 2022-07-25 DIAGNOSIS — K08.89 PAIN, DENTAL: Primary | ICD-10-CM

## 2022-07-25 PROCEDURE — S9088 SERVICES PROVIDED IN URGENT: HCPCS | Performed by: EMERGENCY MEDICINE

## 2022-07-25 PROCEDURE — 99213 OFFICE O/P EST LOW 20 MIN: CPT | Performed by: EMERGENCY MEDICINE

## 2022-07-25 RX ORDER — CLINDAMYCIN HYDROCHLORIDE 150 MG/1
150 CAPSULE ORAL EVERY 8 HOURS SCHEDULED
Qty: 21 CAPSULE | Refills: 0 | Status: SHIPPED | OUTPATIENT
Start: 2022-07-25 | End: 2022-08-01

## 2022-07-25 RX ORDER — DESVENLAFAXINE 50 MG/1
50 TABLET, EXTENDED RELEASE ORAL DAILY
COMMUNITY
Start: 2022-06-16

## 2022-07-25 NOTE — PATIENT INSTRUCTIONS
Dental Abscess   WHAT YOU NEED TO KNOW:   A dental abscess is a collection of pus in or around a tooth  A dental abscess is caused by bacteria  The bacteria can enter the tooth when the enamel (outer part of the tooth) is damaged by tooth decay  Bacteria can also enter the tooth through a chip in the tooth or a cut in the gum  Food particles that are stuck between the teeth for a long time may also lead to an abscess  DISCHARGE INSTRUCTIONS:   Return to the emergency department if:   You have severe pain in your tooth or jaw  You have trouble breathing because of pain or swelling  Call your doctor if:   Your symptoms get worse, even after treatment  Your mouth is bleeding  You cannot eat or drink because of pain or swelling  Your abscess returns  You have an injury that causes a crack in your tooth  You have questions or concerns about your condition or care  Medicines: You may  need any of the following:  Antibiotics  help treat a bacterial infection  NSAIDs , such as ibuprofen, help decrease swelling, pain, and fever  This medicine is available with or without a doctor's order  NSAIDs can cause stomach bleeding or kidney problems in certain people  If you take blood thinner medicine, always ask your healthcare provider if NSAIDs are safe for you  Always read the medicine label and follow directions  Acetaminophen  decreases pain and fever  It is available without a doctor's order  Ask how much to take and how often to take it  Follow directions  Read the labels of all other medicines you are using to see if they also contain acetaminophen, or ask your doctor or pharmacist  Acetaminophen can cause liver damage if not taken correctly  Do not use more than 4 grams (4,000 milligrams) total of acetaminophen in one day  Prescription pain medicine  may be given  Ask your healthcare provider how to take this medicine safely  Some prescription pain medicines contain acetaminophen  Do not take other medicines that contain acetaminophen without talking to your healthcare provider  Too much acetaminophen may cause liver damage  Prescription pain medicine may cause constipation  Ask your healthcare provider how to prevent or treat constipation  Take your medicine as directed  Contact your healthcare provider if you think your medicine is not helping or if you have side effects  Tell him of her if you are allergic to any medicine  Keep a list of the medicines, vitamins, and herbs you take  Include the amounts, and when and why you take them  Bring the list or the pill bottles to follow-up visits  Carry your medicine list with you in case of an emergency  Self-care:   Rinse your mouth every 2 hours with salt water  This will help keep the area clean  Gently brush your teeth twice a day with a soft tooth brush  This will help keep the area clean  Eat soft foods as directed  Soft foods may cause less pain  Examples include applesauce, yogurt, and cooked pasta  Ask your healthcare provider how long to follow this instruction  Apply a warm compress to your tooth or gum  Use a cotton ball or gauze soaked in warm water  Remove the compress in 10 minutes or when it becomes cool  Repeat 3 times a day  Prevent another abscess:   Brush your teeth at least 2 times a day  with fluoride toothpaste  Use dental floss at least once a day  to clean between your teeth  Rinse your mouth with water or mouthwash  after meals and snacks  Chew sugarless gum  Avoid sugary and starchy food that can stick between your teeth  Limit drinks high in sugar, such as soda or fruit juice  See your dentist every 6 months  for dental cleanings and oral exams  Follow up with your doctor or dentist in 24 hours, or as directed: Your healthcare provider will need to check your teeth and gums  Write down your questions so you remember to ask them during your visits     © Copyright [employer] 2022 Information is for End User's use only and may not be sold, redistributed or otherwise used for commercial purposes  All illustrations and images included in CareNotes® are the copyrighted property of A D A M , Inc  or [employer]  The above information is an  only  It is not intended as medical advice for individual conditions or treatments  Talk to your doctor, nurse or pharmacist before following any medical regimen to see if it is safe and effective for you

## 2022-07-25 NOTE — PROGRESS NOTES
[id number]        NAME: Cong Diallo is a 25 y o  female  : 2000    MRN: [id number]  DATE: 2022  TIME: 10:26 AM    Assessment and Plan   Pain, dental [K08 89]  1  Pain, dental  clindamycin (CLEOCIN) 150 mg capsule     Offered dental block but patient refuses  Patient Instructions     Patient Instructions     Dental Abscess   WHAT YOU NEED TO KNOW:   A dental abscess is a collection of pus in or around a tooth  A dental abscess is caused by bacteria  The bacteria can enter the tooth when the enamel (outer part of the tooth) is damaged by tooth decay  Bacteria can also enter the tooth through a chip in the tooth or a cut in the gum  Food particles that are stuck between the teeth for a long time may also lead to an abscess  DISCHARGE INSTRUCTIONS:   Return to the emergency department if:   · You have severe pain in your tooth or jaw  · You have trouble breathing because of pain or swelling  Call your doctor if:   · Your symptoms get worse, even after treatment  · Your mouth is bleeding  · You cannot eat or drink because of pain or swelling  · Your abscess returns  · You have an injury that causes a crack in your tooth  · You have questions or concerns about your condition or care  Medicines: You may  need any of the following:  · Antibiotics  help treat a bacterial infection  · NSAIDs , such as ibuprofen, help decrease swelling, pain, and fever  This medicine is available with or without a doctor's order  NSAIDs can cause stomach bleeding or kidney problems in certain people  If you take blood thinner medicine, always ask your healthcare provider if NSAIDs are safe for you  Always read the medicine label and follow directions  · Acetaminophen  decreases pain and fever  It is available without a doctor's order  Ask how much to take and how often to take it  Follow directions   Read the labels of all other medicines you are using to see if they also contain acetaminophen, or ask your doctor or pharmacist  Acetaminophen can cause liver damage if not taken correctly  Do not use more than 4 grams (4,000 milligrams) total of acetaminophen in one day  · Prescription pain medicine  may be given  Ask your healthcare provider how to take this medicine safely  Some prescription pain medicines contain acetaminophen  Do not take other medicines that contain acetaminophen without talking to your healthcare provider  Too much acetaminophen may cause liver damage  Prescription pain medicine may cause constipation  Ask your healthcare provider how to prevent or treat constipation  · Take your medicine as directed  Contact your healthcare provider if you think your medicine is not helping or if you have side effects  Tell him of her if you are allergic to any medicine  Keep a list of the medicines, vitamins, and herbs you take  Include the amounts, and when and why you take them  Bring the list or the pill bottles to follow-up visits  Carry your medicine list with you in case of an emergency  Self-care:   · Rinse your mouth every 2 hours with salt water  This will help keep the area clean  · Gently brush your teeth twice a day with a soft tooth brush  This will help keep the area clean  · Eat soft foods as directed  Soft foods may cause less pain  Examples include applesauce, yogurt, and cooked pasta  Ask your healthcare provider how long to follow this instruction  · Apply a warm compress to your tooth or gum  Use a cotton ball or gauze soaked in warm water  Remove the compress in 10 minutes or when it becomes cool  Repeat 3 times a day  Prevent another abscess:   · Brush your teeth at least 2 times a day  with fluoride toothpaste  · Use dental floss at least once a day  to clean between your teeth  · Rinse your mouth with water or mouthwash  after meals and snacks  Chew sugarless gum      · Avoid sugary and starchy food that can stick between your teeth  Limit drinks high in sugar, such as soda or fruit juice  · See your dentist every 6 months  for dental cleanings and oral exams  Follow up with your doctor or dentist in 24 hours, or as directed: Your healthcare provider will need to check your teeth and gums  Write down your questions so you remember to ask them during your visits  © Copyright [employer] 2022 Information is for End User's use only and may not be sold, redistributed or otherwise used for commercial purposes  All illustrations and images included in CareNotes® are the copyrighted property of A D A M , Inc  or [facility] [employer]   The above information is an  only  It is not intended as medical advice for individual conditions or treatments  Talk to your doctor, nurse or pharmacist before following any medical regimen to see if it is safe and effective for you  Follow up with PCP in 3-5 days  Proceed to  ER if symptoms worsen  Chief Complaint     Chief Complaint   Patient presents with    Dental Pain     Lower Right Tooth Pain ([place])          History of Present Illness       Patient complains of right lower wisdom tooth pain for the past few days  Dental Pain   Pertinent negatives include no fever or sinus pressure  Review of Systems   Review of Systems   Constitutional: Negative for chills and fever  HENT: Positive for dental problem  Negative for congestion, rhinorrhea, sinus pressure, sore throat, trouble swallowing and voice change  Respiratory: Negative for cough and shortness of breath  Neurological: Negative for headaches           Current Medications       Current Outpatient Medications:     clindamycin (CLEOCIN) 150 mg capsule, Take 1 capsule (150 mg total) by mouth every 8 (eight) hours for 7 days, Disp: 21 capsule, Rfl: 0    desvenlafaxine succinate (PRISTIQ) 50 mg 24 hr tablet, Take 50 mg by mouth daily, Disp: , Rfl:     Xulane 150-35 MCG/24HR, APPLY 1 PATCH TO THE SKIN ONCE WEEKLY, Disp: 3 patch, Rfl: 2    albuterol (VENTOLIN HFA) 90 mcg/act inhaler, Inhale 2 puffs every 4 (four) hours as needed for wheezing, Disp: 1 Inhaler, Rfl: 0    ALPRAZolam (XANAX) 0 25 mg tablet, Take by mouth daily at bedtime as needed for anxiety, Disp: , Rfl:     desvenlafaxine (PRISTIQ) 100 mg 24 hr tablet, Take 1 tablet (100 mg total) by mouth daily, Disp: 30 tablet, Rfl: 2    hydrOXYzine HCL (ATARAX) 50 mg tablet, Take 50 mg by mouth 3 (three) times a day as needed for itching (PRN with panic attack), Disp: , Rfl:     ondansetron (ZOFRAN) 4 mg tablet, Take 1 tablet (4 mg total) by mouth every 8 (eight) hours as needed for nausea or vomiting, Disp: 20 tablet, Rfl: 0    Current Allergies     Allergies as of 2022 - Reviewed 2022   Allergen Reaction Noted    Fruit & vegetable daily [fish oil - food allergy] GI Intolerance 2021    Molds & smuts Allergic Rhinitis 2014    Pollen extract Allergic Rhinitis 2014    Amoxicillin-pot clavulanate GI Intolerance 2018    Lorazepam Rash 2021            The following portions of the patient's history were reviewed and updated as appropriate: allergies, current medications, past family history, past medical history, past social history, past surgical history and problem list      Past Medical History:   Diagnosis Date    Anxiety     Depression     Miscarriage     Scoliosis     Trichotillomania        Past Surgical History:   Procedure Laterality Date    DILATION AND CURETTAGE OF UTERUS      NO PAST SURGERIES      THERAPEUTIC       Non Surgery- Pill Form       Family History   Problem Relation Age of Onset    Mental illness Mother     Endometriosis Mother     Diabetes Father     Mental illness Father     No Known Problems Sister     Hypertension Maternal Grandmother     Alcohol abuse Maternal Grandfather     Mental illness Paternal Grandmother     Mental illness Paternal Grandfather Medications have been verified  Objective   /84 (BP Location: Left arm, Patient Position: Sitting)   Pulse 94   Temp 98 4 °F (36 9 °C)   Resp 17   Ht 5' 2" (1 575 m)   Wt 61 2 kg (135 lb)   LMP 07/24/2022   SpO2 97%   BMI 24 69 kg/m²        Physical Exam     Physical Exam  Vitals and nursing note reviewed  Constitutional:       General: She is not in acute distress  Appearance: She is well-developed  She is not ill-appearing  HENT:      Head: Normocephalic and atraumatic  Nose: Mucosal edema present  Mouth/Throat:      Pharynx: No oropharyngeal exudate or posterior oropharyngeal erythema  Tonsils: No tonsillar abscesses  Cardiovascular:      Rate and Rhythm: Normal rate and regular rhythm  Musculoskeletal:      Cervical back: Neck supple  Lymphadenopathy:      Cervical: No cervical adenopathy  Skin:     General: Skin is warm and dry  Findings: No rash  Neurological:      Mental Status: She is oriented to person, place, and time     Psychiatric:         Mood and Affect: Mood normal

## 2022-07-27 DIAGNOSIS — Z30.45 ENCOUNTER FOR SURVEILLANCE OF TRANSDERMAL PATCH HORMONAL CONTRACEPTIVE DEVICE: ICD-10-CM

## 2022-07-28 RX ORDER — NORELGESTROMIN AND ETHINYL ESTRADIOL 150; 35 UG/D; UG/D
PATCH TRANSDERMAL
Qty: 3 PATCH | Refills: 2 | Status: SHIPPED | OUTPATIENT
Start: 2022-07-28 | End: 2022-08-11 | Stop reason: SDUPTHER

## 2022-08-08 ENCOUNTER — TELEPHONE (OUTPATIENT)
Dept: OBGYN CLINIC | Facility: CLINIC | Age: 22
End: 2022-08-08

## 2022-08-08 NOTE — TELEPHONE ENCOUNTER
Patient left a vmom that she would like to change her pharmacy called the patient u able to leave a vm , her mail box is full

## 2022-08-11 DIAGNOSIS — Z30.45 ENCOUNTER FOR SURVEILLANCE OF TRANSDERMAL PATCH HORMONAL CONTRACEPTIVE DEVICE: ICD-10-CM

## 2022-08-11 RX ORDER — NORELGESTROMIN AND ETHINYL ESTRADIOL 150; 35 UG/D; UG/D
PATCH TRANSDERMAL
Qty: 3 PATCH | Refills: 5 | Status: SHIPPED | OUTPATIENT
Start: 2022-08-11 | End: 2023-02-10

## 2022-09-08 ENCOUNTER — TELEPHONE (OUTPATIENT)
Dept: OBGYN CLINIC | Facility: CLINIC | Age: 22
End: 2022-09-08

## 2022-09-08 NOTE — TELEPHONE ENCOUNTER
Patient called she has a yeast infection, but has gone to an urgent care she is also locked out of my chart number given  for IT to help her fix her my chart

## 2022-11-02 ENCOUNTER — TELEPHONE (OUTPATIENT)
Dept: OBGYN CLINIC | Facility: CLINIC | Age: 22
End: 2022-11-02

## 2022-11-02 DIAGNOSIS — B37.31 VAGINAL YEAST INFECTION: Primary | ICD-10-CM

## 2022-11-02 RX ORDER — FLUCONAZOLE 150 MG/1
150 TABLET ORAL
Qty: 2 TABLET | Refills: 0 | Status: SHIPPED | OUTPATIENT
Start: 2022-11-02 | End: 2022-11-06

## 2022-11-02 NOTE — TELEPHONE ENCOUNTER
Patient was seen in the Emergency room and was told to follow up for recurrent yeast, she could not come in any sooner than next week, she was wondering if you can call something into her pharmacy until then
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
## 2022-11-02 NOTE — TELEPHONE ENCOUNTER
Left a vmom that she would like an appointment, called the patient back no vm set up cannot leave a message

## 2022-12-01 ENCOUNTER — OFFICE VISIT (OUTPATIENT)
Dept: OBGYN CLINIC | Facility: CLINIC | Age: 22
End: 2022-12-01

## 2022-12-01 VITALS
DIASTOLIC BLOOD PRESSURE: 82 MMHG | HEIGHT: 62 IN | BODY MASS INDEX: 26.24 KG/M2 | SYSTOLIC BLOOD PRESSURE: 122 MMHG | WEIGHT: 142.6 LBS

## 2022-12-01 DIAGNOSIS — B37.31 YEAST INFECTION OF THE VAGINA: ICD-10-CM

## 2022-12-01 DIAGNOSIS — N76.0 RECURRENT VAGINITIS: Primary | ICD-10-CM

## 2022-12-01 RX ORDER — FLUCONAZOLE 150 MG/1
150 TABLET ORAL
Qty: 2 TABLET | Refills: 0 | Status: SHIPPED | OUTPATIENT
Start: 2022-12-01 | End: 2022-12-05
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
## 2022-12-02 LAB
CANDIDA RRNA VAG QL PROBE: POSITIVE
G VAGINALIS RRNA GENITAL QL PROBE: NEGATIVE
T VAGINALIS RRNA GENITAL QL PROBE: NEGATIVE

## 2023-02-09 DIAGNOSIS — Z30.45 ENCOUNTER FOR SURVEILLANCE OF TRANSDERMAL PATCH HORMONAL CONTRACEPTIVE DEVICE: ICD-10-CM

## 2023-02-10 ENCOUNTER — TELEPHONE (OUTPATIENT)
Dept: OBGYN CLINIC | Facility: CLINIC | Age: 23
End: 2023-02-10

## 2023-02-10 RX ORDER — NORELGESTROMIN AND ETHINYL ESTRADIOL 150; 35 UG/D; UG/D
PATCH TRANSDERMAL
Qty: 3 PATCH | Refills: 5 | Status: SHIPPED | OUTPATIENT
Start: 2023-02-10

## 2023-02-23 ENCOUNTER — TELEPHONE (OUTPATIENT)
Dept: OBGYN CLINIC | Facility: CLINIC | Age: 23
End: 2023-02-23

## 2023-02-23 DIAGNOSIS — B37.31 VAGINAL YEAST INFECTION: Primary | ICD-10-CM

## 2023-02-23 RX ORDER — FLUCONAZOLE 150 MG/1
150 TABLET ORAL ONCE
Qty: 2 TABLET | Refills: 0 | Status: SHIPPED | OUTPATIENT
Start: 2023-02-23 | End: 2023-02-23

## 2023-02-23 NOTE — TELEPHONE ENCOUNTER
Please call patient and let her know that I electronically sent in fluconazole for her  She can take 1 pill and if symptoms persist 3 days later she can take a second dose  I sent her a [employer] message back in December regarding her positive yeast culture and asked if she would like to consider once a month fluconazole to try to suppress recurrent yeast infection but I never heard back from her  Please see if this is something she would be interested in doing, especially if she did 2 to 3 weeks of boric acid after her last use treatment and did not find it effective

## 2023-02-24 ENCOUNTER — TELEPHONE (OUTPATIENT)
Dept: OBGYN CLINIC | Facility: CLINIC | Age: 23
End: 2023-02-24

## 2023-02-24 NOTE — TELEPHONE ENCOUNTER
I already sent in for the fluconazole dose for her acute yeast infection medication  She will not start monthly suppressive therapy until beginning of April  I do not think her pharmacy will fill 2 different prescriptions for the same medication at once    Please tell patient to contact me through [place] or phone call at the end of March/beginning of April so I can send in the once a month dose for her

## 2023-02-24 NOTE — TELEPHONE ENCOUNTER
Spoke with the patient and made aware medication was sent to the pharmacy, and recommendations were given she will contact [facility] at the end of March, beginning of April

## 2023-03-01 NOTE — TELEPHONE ENCOUNTER
Pt said she is on her second dose of the fluconazole but she is still feeling some of the symptoms  She said she is interested in the once a month fluconazole  She also mentioned her MyChart isn't working so she has not been able to receive her messages

## 2023-03-01 NOTE — TELEPHONE ENCOUNTER
Please tell pt would recommend being seen for vaginal culture if fluconazole is not helping  Also, at present I would not recommend starting suppressive fluconazole therapy if the treatment for the current problem is not relieved by the fluconazole

## 2023-03-03 ENCOUNTER — OFFICE VISIT (OUTPATIENT)
Dept: OBGYN CLINIC | Facility: CLINIC | Age: 23
End: 2023-03-03

## 2023-03-03 VITALS
BODY MASS INDEX: 24.66 KG/M2 | WEIGHT: 134 LBS | SYSTOLIC BLOOD PRESSURE: 150 MMHG | DIASTOLIC BLOOD PRESSURE: 90 MMHG | HEIGHT: 62 IN

## 2023-03-03 DIAGNOSIS — N76.0 RECURRENT VAGINITIS: Primary | ICD-10-CM

## 2023-03-03 NOTE — PROGRESS NOTES
Assessment/Plan:       Diagnoses and all orders for this visit:    Recurrent vaginitis  -     VAGINOSIS DNA PROBE (AFFIRM)      50-year-old female history of recurrent vaginitis, yeast infection  Completed 2-week boric acid as well as taking daily probiotic since December with no issues until present  1 dose fluconazole provided 4 days ago with no symptomatic relief noted until yesterday  No current symptoms today  Exam unremarkable  Patient still desires evaluation and testing    Plan:  Affirm  We will treat based on test results  Continue daily probiotic  Vulvovaginal hygiene reviewed  Patient still concerned about recurrent yeast infection every 1 to 2 months, will highly consider once a month dosing fluconazole for 4 to 6 months and we will discuss this further based on her affirm results  Burnie Génesis patch for birth control  Annual will be due in May 2023, patient encouraged to schedule this    Chief Complaint   Patient presents with   • Vaginal Discharge     Pt states she took her medication and on the 4th day she felt better but not cured , pt brought her other meds with her , boric acid and probiotic so you can discuss with her how to take them       Subjective:      Patient ID: Davi Perdomo is a 25 y o  female  50-year-old female history of recurrent vaginitis, yeast infection presenting today for suspected acute yeast infection  She did the 2 weeks of boric acid and probiotics a few months ago as recommended which helped until present          I subjectively treated her few days ago with fluconazole at patient request   States she took the 1 dose of fluconazole 3 days ago and states sxs didn't go away until yesterday  No sxs today  She did have heavy discharge, itching prior to taking fluconazole dose  She denies any new sexual partners    Birth control patch for contraception      The following portions of the patient's history were reviewed and updated as appropriate: allergies, current medications, past medical history, past social history and problem list     Review of Systems   Constitutional: Negative  Genitourinary:        As in HPI         Objective:      /90 (BP Location: Left arm, Patient Position: Sitting, Cuff Size: Standard)   Ht 5' 2" (1 575 m)   Wt 60 8 kg (134 lb)   LMP 02/10/2023   BMI 24 51 kg/m²          Physical Exam  Vitals reviewed  Constitutional:       Appearance: Normal appearance  Genitourinary:     General: Normal vulva  Labia:         Right: No rash, tenderness or lesion  Left: No rash, tenderness or lesion  Vagina: Normal  No vaginal discharge, erythema, tenderness, bleeding or lesions  Cervix: No cervical motion tenderness  Neurological:      Mental Status: She is alert and oriented to person, place, and time  Psychiatric:         Mood and Affect: Mood normal          Behavior: Behavior normal  Behavior is cooperative

## 2023-03-04 LAB
CANDIDA RRNA VAG QL PROBE: NEGATIVE
G VAGINALIS RRNA GENITAL QL PROBE: NEGATIVE
T VAGINALIS RRNA GENITAL QL PROBE: NEGATIVE

## 2023-03-07 DIAGNOSIS — N76.0 RECURRENT VAGINITIS: Primary | ICD-10-CM

## 2023-03-07 RX ORDER — FLUCONAZOLE 150 MG/1
TABLET ORAL
Qty: 3 TABLET | Refills: 1 | Status: SHIPPED | OUTPATIENT
Start: 2023-03-07 | End: 2023-08-01 | Stop reason: SDUPTHER

## 2023-03-23 ENCOUNTER — OFFICE VISIT (OUTPATIENT)
Dept: OBGYN CLINIC | Facility: CLINIC | Age: 23
End: 2023-03-23

## 2023-03-23 VITALS
DIASTOLIC BLOOD PRESSURE: 70 MMHG | SYSTOLIC BLOOD PRESSURE: 118 MMHG | BODY MASS INDEX: 25.91 KG/M2 | WEIGHT: 140.8 LBS | HEIGHT: 62 IN

## 2023-03-23 DIAGNOSIS — Z72.51 HIGH RISK SEXUAL BEHAVIOR, UNSPECIFIED TYPE: ICD-10-CM

## 2023-03-23 DIAGNOSIS — Z11.3 SCREEN FOR STD (SEXUALLY TRANSMITTED DISEASE): Primary | ICD-10-CM

## 2023-03-23 NOTE — PROGRESS NOTES
Assessment/Plan:       Diagnoses and all orders for this visit:    Screen for STD (sexually transmitted disease)  -     Chlamydia/GC amplified DNA by PCR  -     VAGINOSIS DNA PROBE (AFFIRM)    High risk sexual behavior, unspecified type  -     Chlamydia/GC amplified DNA by PCR  -     VAGINOSIS DNA PROBE (AFFIRM)      24 y/o F requesting STD  Recent new sexual partner as in HPI  Hx of recurrent yeast infx, doing well on suppressive therapy  Xulane patch for contraception  Neg PAP 5/2022    Plan:  AFFIRM  GC/CT  Will call with results    RTO in May for annual, sooner if any problems arise in interim  Chief Complaint   Patient presents with   • std screening     New  Female sexual partner        Subjective:      Patient ID: Luís Velásquez is a 25 y o  female  23y/o F here toda for desire STD screen  States had new sexual partner, female, who also had other male sexual partners  She has no sxs, denies vaginal itching/odor/discharge  Denies vulvar rash/itching  Denies pelvic pain, abnormal vaginal bleeding or urinary sxs      The following portions of the patient's history were reviewed and updated as appropriate: allergies, current medications, past medical history, past social history and problem list     Review of Systems   Constitutional: Negative  Gastrointestinal: Negative  Genitourinary: Negative  Psychiatric/Behavioral: Negative  Objective:      /70 (BP Location: Left arm, Patient Position: Sitting, Cuff Size: Standard)   Ht 5' 2" (1 575 m)   Wt 63 9 kg (140 lb 12 8 oz)   LMP 03/10/2023 Comment: Patch  Breastfeeding No   BMI 25 75 kg/m²          Physical Exam  Vitals reviewed  Constitutional:       Appearance: Normal appearance  Genitourinary:     General: Normal vulva  Labia:         Right: No rash, tenderness or lesion  Left: No rash, tenderness or lesion  Vagina: Normal  No vaginal discharge, erythema, tenderness, bleeding or lesions        Cervix: No cervical motion tenderness  Neurological:      Mental Status: She is alert and oriented to person, place, and time  Psychiatric:         Mood and Affect: Mood normal          Behavior: Behavior normal  Behavior is cooperative

## 2023-03-25 LAB
C TRACH DNA SPEC QL NAA+PROBE: NEGATIVE
N GONORRHOEA DNA SPEC QL NAA+PROBE: NEGATIVE

## 2023-03-27 ENCOUNTER — OFFICE VISIT (OUTPATIENT)
Dept: URGENT CARE | Facility: MEDICAL CENTER | Age: 23
End: 2023-03-27

## 2023-03-27 VITALS
SYSTOLIC BLOOD PRESSURE: 120 MMHG | DIASTOLIC BLOOD PRESSURE: 80 MMHG | BODY MASS INDEX: 25.06 KG/M2 | TEMPERATURE: 97.9 F | WEIGHT: 137 LBS | RESPIRATION RATE: 14 BRPM | OXYGEN SATURATION: 99 % | HEART RATE: 103 BPM

## 2023-03-27 DIAGNOSIS — B30.9 VIRAL CONJUNCTIVITIS: Primary | ICD-10-CM

## 2023-03-27 DIAGNOSIS — J06.9 ACUTE URI: ICD-10-CM

## 2023-03-27 LAB — S PYO AG THROAT QL: NEGATIVE

## 2023-03-27 RX ORDER — GENTAMICIN SULFATE 3 MG/ML
1 SOLUTION/ DROPS OPHTHALMIC 4 TIMES DAILY
Qty: 5 ML | Refills: 0 | Status: SHIPPED | OUTPATIENT
Start: 2023-03-27 | End: 2023-04-03

## 2023-03-27 NOTE — LETTER
March 27, 2023     Patient: Rena Aranog   YOB: 2000   Date of Visit: 3/27/2023       To Whom it May Concern:    Andrés Shirley was seen in my clinic on 3/27/2023  She is to be excuse for her absence from work through 3/29/2023  She may return as of 3/30/2023 as long as her symptoms have resolved  If you have any questions or concerns, please don't hesitate to call           Sincerely,          Vernadine Gottron Genteel, PA-C        CC: No Recipients

## 2023-03-27 NOTE — PROGRESS NOTES
[facility]        NAME: Fortunato Blake is a 25 y o  female  : 2000    MRN: [id number]  DATE: 2023  TIME: 9:39 AM    Assessment and Plan   Viral conjunctivitis [B30 9]  1  Viral conjunctivitis  gentamicin (GARAMYCIN) 0 3 % ophthalmic solution      2  Acute URI  POCT rapid strepA    Throat culture            Patient Instructions     1  Over-the-counter ibuprofen and/or acetaminophen as needed for pain or fever  2  Oxymetazoline nasal spray 2 sprays in each nostril every 12 hours for no more than the next 5 days as needed for nasal congestion  3  Over-the-counter guaifenesin as needed for mucus relief  4  Gargle salt water as needed for sore throat relief  5  Increase oral fluid consumption  6   Apply warm compresses to both eyes as needed for discomfort  7   If your eye symptoms do not improve over the next 3 to 5 days or if they worsen at any time, fill the antibiotic drops and use them as directed  8  Follow-up with primary care provider in 7 days for any persistent symptoms  Chief Complaint     Chief Complaint   Patient presents with   • Conjunctivitis     Both eyes crusted shut this morning  Sore throat started today  Non productive cough  History of Present Illness       25-year-old female patient with a 1 day history of bilateral eye redness and irritation  Patient states that she woke up this morning with her eyes crusted shut but denies any purulent discharge thereafter  States that she does have slight hazy vision but no loss of vision  She states the symptoms are accompanied by mild nasal congestion, sore throat and cough  She denies any fever or chills  Denies any chest pain or shortness of breath  No rhinorrhea  Denies any fatigue or body aches  No GI symptoms  Review of Systems   Review of Systems   Constitutional: Negative for fever  HENT: Positive for congestion and sore throat  Negative for facial swelling, rhinorrhea and sinus pain  Eyes: Positive for pain, discharge and redness  Negative for photophobia, itching and visual disturbance  Respiratory: Positive for cough  Cardiovascular: Negative for chest pain  Gastrointestinal: Negative for abdominal pain  Musculoskeletal: Negative for myalgias  Skin: Negative for rash  Current Medications       Current Outpatient Medications:   •  albuterol (VENTOLIN HFA) 90 mcg/act inhaler, Inhale 2 puffs every 4 (four) hours as needed for wheezing, Disp: 1 Inhaler, Rfl: 0  •  desvenlafaxine succinate (PRISTIQ) 50 mg 24 hr tablet, Take 50 mg by mouth daily, Disp: , Rfl:   •  fluconazole (DIFLUCAN) 150 mg tablet, Take 1 pill PO once a month for 6 months, starting on 4/1/2023 for suppressive therapy recurrent vaginal yeast infection  , Disp: 3 tablet, Rfl: 1  •  gentamicin (GARAMYCIN) 0 3 % ophthalmic solution, Administer 1 drop to both eyes 4 (four) times a day for 7 days, Disp: 5 mL, Rfl: 0  •  norelgestromin-ethinyl estradiol (Xulane) 150-35 MCG/24HR, APPLY 1 PATCH WEEKLY X 3 WEEKS, THEN OFF X 1 WEEK, Disp: 3 patch, Rfl: 5  •  ALPRAZolam (XANAX) 0 25 mg tablet, Take by mouth daily at bedtime as needed for anxiety (Patient not taking: Reported on 12/1/2022), Disp: , Rfl:   •  desvenlafaxine (PRISTIQ) 100 mg 24 hr tablet, Take 1 tablet (100 mg total) by mouth daily (Patient not taking: Reported on 12/1/2022), Disp: 30 tablet, Rfl: 2  •  hydrOXYzine HCL (ATARAX) 50 mg tablet, Take 50 mg by mouth 3 (three) times a day as needed for itching (PRN with panic attack) (Patient not taking: Reported on 3/23/2023), Disp: , Rfl:   •  ondansetron (ZOFRAN) 4 mg tablet, Take 1 tablet (4 mg total) by mouth every 8 (eight) hours as needed for nausea or vomiting (Patient not taking: Reported on 12/1/2022), Disp: 20 tablet, Rfl: 0    Current Allergies     Allergies as of 03/27/2023 - Reviewed 03/27/2023   Allergen Reaction Noted   • Fruit & vegetable daily [fish oil - food allergy] GI Intolerance 08/27/2021 • Molds & smuts Allergic Rhinitis 2014   • Pollen extract Allergic Rhinitis 2014   • Amoxicillin-pot clavulanate GI Intolerance 2018   • Lorazepam Rash 2021            The following portions of the patient's history were reviewed and updated as appropriate: allergies, current medications, past family history, past medical history, past social history, past surgical history and problem list      Past Medical History:   Diagnosis Date   • Anxiety    • Depression    • Miscarriage    • Scoliosis    • Trichotillomania        Past Surgical History:   Procedure Laterality Date   • DILATION AND CURETTAGE OF UTERUS     • NO PAST SURGERIES     • THERAPEUTIC       Non Surgery- Pill Form       Family History   Problem Relation Age of Onset   • Mental illness Mother    • Endometriosis Mother    • Diabetes Father    • Mental illness Father    • No Known Problems Sister    • Hypertension Maternal Grandmother    • Endometriosis Maternal Grandmother    • Alcohol abuse Maternal Grandfather    • Mental illness Paternal Grandmother    • Mental illness Paternal Grandfather    • Stomach cancer Paternal Grandfather          Medications have been verified  Objective   /80   Pulse 103   Temp 97 9 °F (36 6 °C)   Resp 14   Wt 62 1 kg (137 lb)   LMP 03/10/2023 Comment: Patch  SpO2 99%   BMI 25 06 kg/m²        Physical Exam     Physical Exam  Vitals and nursing note reviewed  Constitutional:       General: She is not in acute distress  Appearance: Normal appearance  She is not ill-appearing or toxic-appearing  HENT:      Head: Normocephalic  Right Ear: Tympanic membrane normal       Left Ear: Tympanic membrane normal       Nose: Congestion present  No rhinorrhea  Mouth/Throat:      Mouth: Mucous membranes are moist       Pharynx: Posterior oropharyngeal erythema present  Eyes:      Pupils: Pupils are equal, round, and reactive to light        Comments: Moderate bilateral conjunctival injection without visible discharge  Cardiovascular:      Rate and Rhythm: Normal rate and regular rhythm  Pulses: Normal pulses  Pulmonary:      Effort: Pulmonary effort is normal       Breath sounds: Normal breath sounds  Abdominal:      Tenderness: There is no abdominal tenderness  Musculoskeletal:         General: Normal range of motion  Cervical back: Normal range of motion  Skin:     General: Skin is warm and dry  Capillary Refill: Capillary refill takes less than 2 seconds  Neurological:      Mental Status: She is alert and oriented to person, place, and time     Psychiatric:         Mood and Affect: Mood normal          Behavior: Behavior normal

## 2023-03-27 NOTE — PATIENT INSTRUCTIONS
1  Over-the-counter ibuprofen and/or acetaminophen as needed for pain or fever  2  Oxymetazoline nasal spray 2 sprays in each nostril every 12 hours for no more than the next 5 days as needed for nasal congestion  3  Over-the-counter guaifenesin as needed for mucus relief  4  Gargle salt water as needed for sore throat relief  5  Increase oral fluid consumption  6   Apply warm compresses to both eyes as needed for discomfort  7   If your eye symptoms do not improve over the next 3 to 5 days or if they worsen at any time, fill the antibiotic drops and use them as directed  8  Follow-up with primary care provider in 7 days for any persistent symptoms

## 2023-03-29 DIAGNOSIS — Z30.45 ENCOUNTER FOR SURVEILLANCE OF TRANSDERMAL PATCH HORMONAL CONTRACEPTIVE DEVICE: ICD-10-CM

## 2023-03-29 LAB — BACTERIA THROAT CULT: NORMAL

## 2023-03-29 RX ORDER — NORELGESTROMIN AND ETHINYL ESTRADIOL 150; 35 UG/D; UG/D
PATCH TRANSDERMAL
Qty: 3 PATCH | Refills: 5 | Status: SHIPPED | OUTPATIENT
Start: 2023-03-29

## 2023-03-29 NOTE — TELEPHONE ENCOUNTER
Patient needs new RX sent to [facility] pharmacy for xulane patch

## 2023-07-31 ENCOUNTER — OFFICE VISIT (OUTPATIENT)
Dept: FAMILY MEDICINE CLINIC | Facility: CLINIC | Age: 23
End: 2023-07-31
Payer: COMMERCIAL

## 2023-07-31 VITALS
HEART RATE: 76 BPM | WEIGHT: 142.5 LBS | TEMPERATURE: 97.6 F | SYSTOLIC BLOOD PRESSURE: 126 MMHG | HEIGHT: 62 IN | DIASTOLIC BLOOD PRESSURE: 74 MMHG | OXYGEN SATURATION: 98 % | BODY MASS INDEX: 26.22 KG/M2

## 2023-07-31 DIAGNOSIS — F39 MOOD DISORDER (HCC): ICD-10-CM

## 2023-07-31 DIAGNOSIS — R48.0 DYSLEXIA: ICD-10-CM

## 2023-07-31 DIAGNOSIS — F90.9 ATTENTION DEFICIT HYPERACTIVITY DISORDER (ADHD), UNSPECIFIED ADHD TYPE: ICD-10-CM

## 2023-07-31 DIAGNOSIS — M41.129 ADOLESCENT IDIOPATHIC SCOLIOSIS, UNSPECIFIED SPINAL REGION: ICD-10-CM

## 2023-07-31 DIAGNOSIS — F41.9 ANXIETY: ICD-10-CM

## 2023-07-31 DIAGNOSIS — H93.25 AUDITORY PROCESSING DISORDER, ACQUIRED: ICD-10-CM

## 2023-07-31 DIAGNOSIS — Z00.00 ANNUAL PHYSICAL EXAM: Primary | ICD-10-CM

## 2023-07-31 PROCEDURE — 99395 PREV VISIT EST AGE 18-39: CPT | Performed by: FAMILY MEDICINE

## 2023-07-31 PROCEDURE — 3725F SCREEN DEPRESSION PERFORMED: CPT | Performed by: FAMILY MEDICINE
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
## 2023-08-01 DIAGNOSIS — B37.31 VAGINAL YEAST INFECTION: Primary | ICD-10-CM

## 2023-08-01 DIAGNOSIS — N76.0 RECURRENT VAGINITIS: ICD-10-CM

## 2023-08-01 RX ORDER — FLUCONAZOLE 150 MG/1
TABLET ORAL
Qty: 2 TABLET | Refills: 0 | Status: SHIPPED | OUTPATIENT
Start: 2023-08-01 | End: 2023-08-07

## 2023-08-01 RX ORDER — FLUCONAZOLE 150 MG/1
150 TABLET ORAL ONCE
COMMUNITY
End: 2023-08-07

## 2023-08-04 ENCOUNTER — APPOINTMENT (OUTPATIENT)
Dept: LAB | Facility: CLINIC | Age: 23
End: 2023-08-04
Payer: COMMERCIAL

## 2023-08-04 DIAGNOSIS — Z00.00 ANNUAL PHYSICAL EXAM: ICD-10-CM

## 2023-08-04 PROCEDURE — 36415 COLL VENOUS BLD VENIPUNCTURE: CPT

## 2023-08-04 PROCEDURE — 86480 TB TEST CELL IMMUN MEASURE: CPT

## 2023-08-07 ENCOUNTER — OFFICE VISIT (OUTPATIENT)
Dept: OBGYN CLINIC | Facility: CLINIC | Age: 23
End: 2023-08-07
Payer: COMMERCIAL

## 2023-08-07 VITALS
HEIGHT: 62 IN | DIASTOLIC BLOOD PRESSURE: 82 MMHG | SYSTOLIC BLOOD PRESSURE: 122 MMHG | BODY MASS INDEX: 26.5 KG/M2 | WEIGHT: 144 LBS

## 2023-08-07 DIAGNOSIS — N89.8 VAGINAL ODOR: Primary | ICD-10-CM

## 2023-08-07 DIAGNOSIS — Z11.3 SCREEN FOR STD (SEXUALLY TRANSMITTED DISEASE): ICD-10-CM

## 2023-08-07 LAB
GAMMA INTERFERON BACKGROUND BLD IA-ACNC: 0.04 IU/ML
M TB IFN-G BLD-IMP: NEGATIVE
M TB IFN-G CD4+ BCKGRND COR BLD-ACNC: 0.01 IU/ML
M TB IFN-G CD4+ BCKGRND COR BLD-ACNC: 0.03 IU/ML
MITOGEN IGNF BCKGRD COR BLD-ACNC: >10 IU/ML

## 2023-08-07 PROCEDURE — 87660 TRICHOMONAS VAGIN DIR PROBE: CPT | Performed by: PHYSICIAN ASSISTANT

## 2023-08-07 PROCEDURE — 87510 GARDNER VAG DNA DIR PROBE: CPT | Performed by: PHYSICIAN ASSISTANT

## 2023-08-07 PROCEDURE — 87480 CANDIDA DNA DIR PROBE: CPT | Performed by: PHYSICIAN ASSISTANT

## 2023-08-07 PROCEDURE — 87491 CHLMYD TRACH DNA AMP PROBE: CPT | Performed by: PHYSICIAN ASSISTANT

## 2023-08-07 PROCEDURE — 99213 OFFICE O/P EST LOW 20 MIN: CPT | Performed by: PHYSICIAN ASSISTANT

## 2023-08-07 PROCEDURE — 87591 N.GONORRHOEAE DNA AMP PROB: CPT | Performed by: PHYSICIAN ASSISTANT

## 2023-08-07 NOTE — PROGRESS NOTES
Assessment/Plan:       Diagnoses and all orders for this visit:    Vaginal odor  -     VAGINOSIS DNA PROBE (AFFIRM)    Screen for STD (sexually transmitted disease)  -     Chlamydia/GC amplified DNA by PCR  -     VAGINOSIS DNA PROBE (AFFIRM)      81-year-old female presenting today requesting STD screening  Mild vaginal odor otherwise asymptomatic  Cervical cancer screening up-to-date from May 2022  Using Jane Bihari birth control patch  Examination today unremarkable    Plan:  GC/CT  Affirm  We will call with results and treat accordingly  Reschedule missed appointment from May 2023 for annual before end of the year    Chief Complaint   Patient presents with   • std screen       Subjective:      Patient ID: Joslyn Goff is a 26 y.o. female. 22y/o female here today for STD screen. She states she had new partner, and she also appreciates a mild vaginal odor. She denies any current vaginal itching or abnormal discharge. Denies abnormal bleeding or pelvic pain. Jane Bihari patch for [facility]          The following portions of the patient's history were reviewed and updated as appropriate: allergies, current medications, past family history, past medical history, past social history, past surgical history and problem list.    Review of Systems   Constitutional: Negative. Genitourinary: Negative. Objective:      /82 (BP Location: Left arm, Patient Position: Sitting, Cuff Size: Large)   Ht 5' 2" (1.575 m)   Wt 65.3 kg (144 lb)   LMP 07/28/2023 Comment: patch  Breastfeeding No   BMI 26.34 kg/m²          Physical Exam  Vitals reviewed. Constitutional:       Appearance: Normal appearance. Genitourinary:     General: Normal vulva. Vagina: Normal.      Cervix: Normal.   Neurological:      Mental Status: She is alert and oriented to person, place, and time. Psychiatric:         Mood and Affect: Mood normal.         Behavior: Behavior normal. Behavior is cooperative.

## 2023-08-09 LAB
C TRACH DNA SPEC QL NAA+PROBE: NEGATIVE
N GONORRHOEA DNA SPEC QL NAA+PROBE: NEGATIVE

## 2023-08-10 ENCOUNTER — TELEPHONE (OUTPATIENT)
Dept: OBGYN CLINIC | Facility: CLINIC | Age: 23
End: 2023-08-10

## 2023-08-10 DIAGNOSIS — B96.89 BV (BACTERIAL VAGINOSIS): Primary | ICD-10-CM

## 2023-08-10 DIAGNOSIS — N76.0 BV (BACTERIAL VAGINOSIS): Primary | ICD-10-CM

## 2023-08-10 LAB
CANDIDA RRNA VAG QL PROBE: NEGATIVE
G VAGINALIS RRNA GENITAL QL PROBE: POSITIVE
T VAGINALIS RRNA GENITAL QL PROBE: NEGATIVE

## 2023-08-10 RX ORDER — METRONIDAZOLE 500 MG/1
500 TABLET ORAL EVERY 12 HOURS SCHEDULED
Qty: 14 TABLET | Refills: 0 | Status: SHIPPED | OUTPATIENT
Start: 2023-08-10 | End: 2023-08-17

## 2023-08-10 NOTE — TELEPHONE ENCOUNTER
Can you call pt regarding her Affirm results, she is leaving the state tomorrow and is hoping to discuss results with you before she leaves.

## 2023-09-05 ENCOUNTER — VBI (OUTPATIENT)
Dept: ADMINISTRATIVE | Facility: OTHER | Age: 23
End: 2023-09-05

## 2023-09-05 DIAGNOSIS — Z30.45 ENCOUNTER FOR SURVEILLANCE OF TRANSDERMAL PATCH HORMONAL CONTRACEPTIVE DEVICE: ICD-10-CM

## 2023-09-05 RX ORDER — NORELGESTROMIN AND ETHINYL ESTRADIOL 150; 35 UG/D; UG/D
PATCH TRANSDERMAL
Qty: 3 PATCH | Refills: 5 | Status: SHIPPED | OUTPATIENT
Start: 2023-09-05

## 2023-09-05 NOTE — TELEPHONE ENCOUNTER
09/05/23 9:32 AM     VB [employer] SmartForm used to document caregap status.     Judy Oviedo MA unknown

## 2023-09-11 ENCOUNTER — OFFICE VISIT (OUTPATIENT)
Dept: OBGYN CLINIC | Facility: CLINIC | Age: 23
End: 2023-09-11
Payer: COMMERCIAL

## 2023-09-11 VITALS
SYSTOLIC BLOOD PRESSURE: 122 MMHG | WEIGHT: 142.4 LBS | HEIGHT: 62 IN | DIASTOLIC BLOOD PRESSURE: 70 MMHG | BODY MASS INDEX: 26.2 KG/M2

## 2023-09-11 DIAGNOSIS — N89.8 VAGINAL IRRITATION: Primary | ICD-10-CM

## 2023-09-11 DIAGNOSIS — Z11.3 SCREENING EXAMINATION FOR STD (SEXUALLY TRANSMITTED DISEASE): ICD-10-CM

## 2023-09-11 PROCEDURE — 87480 CANDIDA DNA DIR PROBE: CPT | Performed by: PHYSICIAN ASSISTANT

## 2023-09-11 PROCEDURE — 87510 GARDNER VAG DNA DIR PROBE: CPT | Performed by: PHYSICIAN ASSISTANT

## 2023-09-11 PROCEDURE — 87591 N.GONORRHOEAE DNA AMP PROB: CPT | Performed by: PHYSICIAN ASSISTANT

## 2023-09-11 PROCEDURE — 99213 OFFICE O/P EST LOW 20 MIN: CPT | Performed by: PHYSICIAN ASSISTANT

## 2023-09-11 PROCEDURE — 87491 CHLMYD TRACH DNA AMP PROBE: CPT | Performed by: PHYSICIAN ASSISTANT

## 2023-09-11 PROCEDURE — 87660 TRICHOMONAS VAGIN DIR PROBE: CPT | Performed by: PHYSICIAN ASSISTANT

## 2023-09-11 NOTE — PROGRESS NOTES
Assessment/Plan:       Diagnoses and all orders for this visit:    Vaginal irritation  -     VAGINOSIS DNA PROBE (AFFIRM)    Screening examination for STD (sexually transmitted disease)  -     Chlamydia/GC amplified DNA by PCR  -     VAGINOSIS DNA PROBE (AFFIRM)      71-year-old female vaginal irritation, history of BV and yeast.  Sexually active, currently monogamous  Desire BV testing and STD cultures. Antoniette Aliment patch for birth control, doing well  Vulvovaginal and pelvic exam normal today    Plan:  Affirm  GC/CT  We will call patient with results and treat accordingly  RTO for annual    Chief Complaint   Patient presents with   • std screen     Vaginal pain  no odor,  no discharge no itching has irritation        Subjective:      Patient ID: Maurice Wyatt is a 26 y.o. female. 24y/o F here today for possible BV. She states she has irritation and burning in vagina past few days after sex, no odor or abnormal discharge. Wants Std screen. She has no pelvic pain, no urinary sxs. She is using the  Probiotic for urinary and vaginal health. She is sexually active with same partner. She had 1 episode of bleeding during and after sex last month and has not had any recurrence since. The following portions of the patient's history were reviewed and updated as appropriate: allergies, current medications, past family history, past medical history, past social history, past surgical history and problem list.    Review of Systems   Constitutional: Negative. Respiratory: Negative. Cardiovascular: Negative. Gastrointestinal: Negative. Genitourinary:        As in HPI   Psychiatric/Behavioral: Negative. Objective:      /70 (BP Location: Right arm, Patient Position: Sitting, Cuff Size: Standard)   Ht 5' 2" (1.575 m)   Wt 64.6 kg (142 lb 6.4 oz)   LMP 08/27/2023 Comment: patch  Breastfeeding No   BMI 26.05 kg/m²          Physical Exam  Vitals reviewed.    Constitutional: Appearance: Normal appearance. Abdominal:      Tenderness: There is no abdominal tenderness. Genitourinary:     General: Normal vulva. Labia:         Right: No rash, tenderness or lesion. Left: No rash, tenderness or lesion. Vagina: Normal. No vaginal discharge, erythema, tenderness, bleeding or lesions. Cervix: No cervical motion tenderness, discharge, friability, lesion, erythema or cervical bleeding. Uterus: Normal. Not tender. Adnexa:         Right: No tenderness. Left: No tenderness. Neurological:      Mental Status: She is alert and oriented to person, place, and time. Psychiatric:         Mood and Affect: Mood normal.         Behavior: Behavior normal. Behavior is cooperative.

## 2023-09-12 ENCOUNTER — TELEPHONE (OUTPATIENT)
Dept: OBGYN CLINIC | Facility: CLINIC | Age: 23
End: 2023-09-12

## 2023-09-12 DIAGNOSIS — B96.89 BV (BACTERIAL VAGINOSIS): ICD-10-CM

## 2023-09-12 DIAGNOSIS — N76.0 BV (BACTERIAL VAGINOSIS): ICD-10-CM

## 2023-09-12 DIAGNOSIS — A74.9 CHLAMYDIA INFECTION: Primary | ICD-10-CM

## 2023-09-12 LAB
C TRACH DNA SPEC QL NAA+PROBE: POSITIVE
CANDIDA RRNA VAG QL PROBE: NEGATIVE
G VAGINALIS RRNA GENITAL QL PROBE: POSITIVE
N GONORRHOEA DNA SPEC QL NAA+PROBE: NEGATIVE
T VAGINALIS RRNA GENITAL QL PROBE: NEGATIVE

## 2023-09-12 RX ORDER — METRONIDAZOLE 500 MG/1
500 TABLET ORAL EVERY 12 HOURS SCHEDULED
Qty: 14 TABLET | Refills: 0 | Status: SHIPPED | OUTPATIENT
Start: 2023-09-12 | End: 2023-09-19

## 2023-09-12 RX ORDER — DOXYCYCLINE 100 MG/1
100 TABLET ORAL 2 TIMES DAILY
Qty: 14 TABLET | Refills: 0 | Status: SHIPPED | OUTPATIENT
Start: 2023-09-12 | End: 2023-09-19

## 2023-09-12 NOTE — TELEPHONE ENCOUNTER
I called and spoke to patient regarding positive chlamydia results, affirm culture still pending. Counseled patient regarding chlamydia, she already notified partner. Course of doxycycline discussed and sent electronically to pharmacy. Advise no sex for 2 weeks. We will plan test of cure 3 months from now. Patient aware we will contact her once affirm culture is back. STAFF: Please add patient to my schedule for Tuesday, December 12 at 8:30 AM for test of cure chlamydia.   Thank you

## 2023-09-21 ENCOUNTER — TELEPHONE (OUTPATIENT)
Dept: OBGYN CLINIC | Facility: CLINIC | Age: 23
End: 2023-09-21

## 2023-09-21 DIAGNOSIS — B37.31 VAGINAL YEAST INFECTION: Primary | ICD-10-CM

## 2023-09-21 RX ORDER — FLUCONAZOLE 150 MG/1
150 TABLET ORAL
Qty: 2 TABLET | Refills: 0 | Status: SHIPPED | OUTPATIENT
Start: 2023-09-21 | End: 2023-09-25

## 2023-09-21 NOTE — TELEPHONE ENCOUNTER
Patient requesting diflucan. Patient complaining of vaginal (internal) itching. States not noticing discharge due to having period. Patient states was recently on doxycycline which normally give her a yeast infection.

## 2023-09-21 NOTE — TELEPHONE ENCOUNTER
I called and spoke to patient. I also reviewed her [employer] message. We will treat with 2 doses of fluconazole 3 days apart. She did complete doxycycline and BV treatment accordingly. Patient already has appointment on the 25th with me. If symptoms completely resolved she can cancel the appointment. If symptoms persist can keep appointment and come in and get checked. Patient agrees to plan. Yeast infection medication sent to pharmacy.

## 2023-09-25 ENCOUNTER — OFFICE VISIT (OUTPATIENT)
Dept: OBGYN CLINIC | Facility: CLINIC | Age: 23
End: 2023-09-25
Payer: COMMERCIAL

## 2023-09-25 VITALS
SYSTOLIC BLOOD PRESSURE: 128 MMHG | DIASTOLIC BLOOD PRESSURE: 88 MMHG | BODY MASS INDEX: 26.68 KG/M2 | HEIGHT: 62 IN | WEIGHT: 145 LBS

## 2023-09-25 DIAGNOSIS — N89.8 VAGINAL ITCHING: ICD-10-CM

## 2023-09-25 DIAGNOSIS — Z86.19 HISTORY OF CHLAMYDIA: ICD-10-CM

## 2023-09-25 DIAGNOSIS — N76.0 BV (BACTERIAL VAGINOSIS): Primary | ICD-10-CM

## 2023-09-25 DIAGNOSIS — Z30.45 ENCOUNTER FOR SURVEILLANCE OF TRANSDERMAL PATCH HORMONAL CONTRACEPTIVE DEVICE: ICD-10-CM

## 2023-09-25 DIAGNOSIS — Z11.3 SCREEN FOR STD (SEXUALLY TRANSMITTED DISEASE): ICD-10-CM

## 2023-09-25 DIAGNOSIS — B96.89 BV (BACTERIAL VAGINOSIS): Primary | ICD-10-CM

## 2023-09-25 PROCEDURE — 87591 N.GONORRHOEAE DNA AMP PROB: CPT | Performed by: PHYSICIAN ASSISTANT

## 2023-09-25 PROCEDURE — 87480 CANDIDA DNA DIR PROBE: CPT | Performed by: PHYSICIAN ASSISTANT

## 2023-09-25 PROCEDURE — 87491 CHLMYD TRACH DNA AMP PROBE: CPT | Performed by: PHYSICIAN ASSISTANT

## 2023-09-25 PROCEDURE — 87660 TRICHOMONAS VAGIN DIR PROBE: CPT | Performed by: PHYSICIAN ASSISTANT

## 2023-09-25 PROCEDURE — 87510 GARDNER VAG DNA DIR PROBE: CPT | Performed by: PHYSICIAN ASSISTANT

## 2023-09-25 PROCEDURE — 99213 OFFICE O/P EST LOW 20 MIN: CPT | Performed by: PHYSICIAN ASSISTANT

## 2023-09-25 RX ORDER — NORELGESTROMIN AND ETHINYL ESTRADIOL 150; 35 UG/D; UG/D
PATCH TRANSDERMAL
Qty: 9 PATCH | Refills: 3 | Status: SHIPPED | OUTPATIENT
Start: 2023-09-25

## 2023-09-25 RX ORDER — METRONIDAZOLE 500 MG/1
500 TABLET ORAL EVERY 12 HOURS SCHEDULED
Qty: 14 TABLET | Refills: 0 | Status: SHIPPED | OUTPATIENT
Start: 2023-09-25 | End: 2023-09-26
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
## 2023-09-26 DIAGNOSIS — A74.9 CHLAMYDIA INFECTION: Primary | ICD-10-CM

## 2023-09-26 RX ORDER — DOXYCYCLINE HYCLATE 100 MG/1
100 CAPSULE ORAL EVERY 12 HOURS SCHEDULED
Qty: 14 CAPSULE | Refills: 0 | Status: SHIPPED | OUTPATIENT
Start: 2023-09-26 | End: 2023-10-03

## 2023-09-26 RX ORDER — AZITHROMYCIN 500 MG/1
1000 TABLET, FILM COATED ORAL DAILY
Qty: 2 TABLET | Refills: 0 | Status: SHIPPED | OUTPATIENT
Start: 2023-09-26 | End: 2023-09-27

## 2023-10-03 ENCOUNTER — TELEPHONE (OUTPATIENT)
Dept: OBGYN CLINIC | Facility: CLINIC | Age: 23
End: 2023-10-03

## 2023-10-03 DIAGNOSIS — B37.31 CANDIDA VAGINITIS: Primary | ICD-10-CM

## 2023-10-03 RX ORDER — CLOTRIMAZOLE 1 %
1 CREAM WITH APPLICATOR VAGINAL
Qty: 45 G | Refills: 0 | Status: SHIPPED | OUTPATIENT
Start: 2023-10-03 | End: 2023-10-10
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
## 2023-10-04 ENCOUNTER — TELEPHONE (OUTPATIENT)
Dept: FAMILY MEDICINE CLINIC | Facility: CLINIC | Age: 23
End: 2023-10-04

## 2023-10-04 DIAGNOSIS — B37.9 CANDIDA ALBICANS INFECTION: Primary | ICD-10-CM

## 2023-10-04 RX ORDER — FLUCONAZOLE 150 MG/1
150 TABLET ORAL
Qty: 2 TABLET | Refills: 0 | Status: SHIPPED | OUTPATIENT
Start: 2023-10-04 | End: 2023-10-08

## 2023-10-04 NOTE — TELEPHONE ENCOUNTER
Patient notified, but would like to speak with you about reoccurring std, states she has not been with any new partners and is concerned. States she finished the second dose of treatment yesterday but wants a peace of mind.

## 2023-10-04 NOTE — TELEPHONE ENCOUNTER
Need PPD and Flu shot with nurse. .. patient does not want to schedule it until next month due to insurance reasons. .  She will call back

## 2023-10-04 NOTE — TELEPHONE ENCOUNTER
Please call patient to schedule Nurse Visit for Flu shot & PPD . Patient has school forms to be filled out.

## 2023-10-06 NOTE — TELEPHONE ENCOUNTER
I spoke to the patient has question regarding her recent infection patient said her symptoms significantly improved after taking the antibiotics patient instructed to keep her appointment as scheduled in December

## 2023-10-10 ENCOUNTER — HOSPITAL ENCOUNTER (EMERGENCY)
Facility: HOSPITAL | Age: 23
Discharge: HOME | End: 2023-10-11
Attending: EMERGENCY MEDICINE
Payer: COMMERCIAL

## 2023-10-10 VITALS
DIASTOLIC BLOOD PRESSURE: 102 MMHG | SYSTOLIC BLOOD PRESSURE: 147 MMHG | TEMPERATURE: 98.5 F | OXYGEN SATURATION: 99 % | RESPIRATION RATE: 18 BRPM | HEART RATE: 79 BPM

## 2023-10-10 DIAGNOSIS — B37.31 CANDIDA VAGINITIS: ICD-10-CM

## 2023-10-10 DIAGNOSIS — K08.89 PAIN, DENTAL: Primary | ICD-10-CM

## 2023-10-10 PROCEDURE — 99283 EMERGENCY DEPT VISIT LOW MDM: CPT

## 2023-10-10 NOTE — Clinical Note
Leyla Ron was seen and treated in our emergency department on 10/10/2023. No restrictions            Diagnosis:     Grayson Figueredo  may return to work on return date. She may return on this date: 10/12/2023         If you have any questions or concerns, please don't hesitate to call.       Khris Noe MD    ______________________________           _______________          _______________  Hospital Representative                              Date                                Time

## 2023-10-11 PROCEDURE — 99284 EMERGENCY DEPT VISIT MOD MDM: CPT | Performed by: PHYSICIAN ASSISTANT

## 2023-10-11 PROCEDURE — 96372 THER/PROPH/DIAG INJ SC/IM: CPT

## 2023-10-11 RX ORDER — CLINDAMYCIN HYDROCHLORIDE 300 MG/1
300 CAPSULE ORAL 3 TIMES DAILY
Qty: 21 CAPSULE | Refills: 0 | Status: SHIPPED | OUTPATIENT
Start: 2023-10-11 | End: 2023-10-18

## 2023-10-11 RX ORDER — FLUCONAZOLE 150 MG/1
150 TABLET ORAL DAILY
Qty: 2 TABLET | Refills: 0 | Status: SHIPPED | OUTPATIENT
Start: 2023-10-11 | End: 2023-10-13

## 2023-10-11 RX ORDER — NAPROXEN 500 MG/1
500 TABLET ORAL 2 TIMES DAILY WITH MEALS
Qty: 14 TABLET | Refills: 0 | Status: SHIPPED | OUTPATIENT
Start: 2023-10-11 | End: 2023-10-18

## 2023-10-11 RX ORDER — KETOROLAC TROMETHAMINE 30 MG/ML
15 INJECTION, SOLUTION INTRAMUSCULAR; INTRAVENOUS ONCE
Status: COMPLETED | OUTPATIENT
Start: 2023-10-11 | End: 2023-10-11

## 2023-10-11 RX ADMIN — KETOROLAC TROMETHAMINE 15 MG: 30 INJECTION, SOLUTION INTRAMUSCULAR; INTRAVENOUS at 00:23

## 2023-10-11 NOTE — ED PROVIDER NOTES
History  Chief Complaint   Patient presents with   • Dental Pain     Pt reports 4 infected wisdom teeth and is unable to get surgery right now. Pain is now burning. Last took tylenol around 540 tonight. Just finished abx.      52-year-old female presents to emergency room for evaluation of dental pain. Pain increased today. States she has been evaluated for this before and told she has impacted wisdom teeth. Last week she was on doxycycline and developed a yeast infection. States symptoms only improved for 3 days and now they are worse again. Pain radiates into the left jaw. Admits to feeling hot and nauseous. She has been taking a lot of Tylenol, not more than 4 g in 24 hours. Patient states she has been having trouble getting into a oral surgeon due to her limited insurance coverage. Dental Pain  Associated symptoms: no facial swelling and no fever        Prior to Admission Medications   Prescriptions Last Dose Informant Patient Reported? Taking?    albuterol (VENTOLIN HFA) 90 mcg/act inhaler  Self No No   Sig: Inhale 2 puffs every 4 (four) hours as needed for wheezing   clotrimazole (GYNE-LOTRIMIN) 1 % vaginal cream   No No   Sig: Insert 1 applicator into the vagina daily at bedtime for 7 days   norelgestromin-ethinyl estradiol (Xulane) 150-35 MCG/24HR   No No   Sig: APPLY 1 PATCH TO THE SKIN ONCE WEEKLY FOR THREE WEEKS THEN OFF FOR ONE WEEK      Facility-Administered Medications: None       Past Medical History:   Diagnosis Date   • Anxiety    • Asthma    • Chlamydia    • Depression    • Miscarriage    • Scoliosis    • Trichotillomania    • Urogenital trichomoniasis        Past Surgical History:   Procedure Laterality Date   • DILATION AND CURETTAGE OF UTERUS     • NO PAST SURGERIES     • THERAPEUTIC       Non Surgery- Pill Form       Family History   Problem Relation Age of Onset   • Mental illness Mother    • Endometriosis Mother    • Diabetes Father    • Mental illness Father    • No Known Problems Sister    • Hypertension Maternal Grandmother    • Endometriosis Maternal Grandmother    • Alcohol abuse Maternal Grandfather    • Mental illness Paternal Grandmother    • Mental illness Paternal Grandfather    • Stomach cancer Paternal Grandfather      I have reviewed and agree with the history as documented. E-Cigarette/Vaping   • E-Cigarette Use Never User      E-Cigarette/Vaping Substances   • Nicotine No    • THC No    • CBD No    • Flavoring No    • Other No    • Unknown No      Social History     Tobacco Use   • Smoking status: Never   • Smokeless tobacco: Never   Vaping Use   • Vaping Use: Never used   Substance Use Topics   • Alcohol use: Never   • Drug use: Never       Review of Systems   Constitutional: Negative for chills and fever. HENT: Positive for dental problem. Negative for facial swelling and trouble swallowing. Respiratory: Negative for shortness of breath. Cardiovascular: Negative for chest pain. Gastrointestinal: Positive for nausea. Negative for vomiting. Skin: Negative for rash. Physical Exam  Physical Exam  Vitals and nursing note reviewed. Constitutional:       Appearance: Normal appearance. HENT:      Head: Atraumatic. Jaw: There is normal jaw occlusion. Tenderness present. No trismus or swelling. Right Ear: External ear normal.      Left Ear: External ear normal.      Nose: Nose normal.      Mouth/Throat:      Dentition: Abnormal dentition. Dental tenderness present. No dental abscesses. Pharynx: Oropharynx is clear. Eyes:      General: No scleral icterus. Musculoskeletal:      Cervical back: Neck supple. Lymphadenopathy:      Cervical: No cervical adenopathy. Neurological:      Mental Status: She is alert.          Vital Signs  ED Triage Vitals   Temperature Pulse Respirations Blood Pressure SpO2   10/10/23 2315 10/10/23 2313 10/10/23 2313 10/10/23 2313 10/10/23 2313   98.5 °F (36.9 °C) 79 18 (!) 147/102 99 %      Temp Source Heart Rate Source Patient Position - Orthostatic VS BP Location FiO2 (%)   10/10/23 2315 10/10/23 2313 -- 10/10/23 2313 --   Oral Monitor  Left arm       Pain Score       10/10/23 2313       8           Vitals:    10/10/23 2313   BP: (!) 147/102   Pulse: 79         Visual Acuity      ED Medications  Medications   ketorolac (TORADOL) injection 15 mg (15 mg Intramuscular Given 10/11/23 0023)       Diagnostic Studies  Results Reviewed     None                 No orders to display              Procedures  Procedures         ED Course                                             Medical Decision Making  Risk  Prescription drug management. Disposition  Final diagnoses:   Pain, dental   Candida vaginitis     Time reflects when diagnosis was documented in both MDM as applicable and the Disposition within this note     Time User Action Codes Description Comment    10/11/2023 12:12 AM Marcus Stovall [K08.89] Pain, dental     10/11/2023 12:30 AM Marcus Stovall [B37.31] Candida vaginitis       ED Disposition     ED Disposition   Discharge    Condition   Stable    Date/Time   Wed Oct 11, 2023 12:12 AM    Comment   Janel House discharge to home/self care.                Follow-up Information     Follow up With Specialties Details Why Contact Info Additional Information    Cast Lipoma, DMD Oral Maxillofacial Surgery In 3 days  [address]       [address] [place] [address] Emergency Department Emergency Medicine  If symptoms worsen [address] [facility], [place], [place], [address]          Discharge Medication List as of 10/11/2023 12:30 AM      START taking these medications    Details   clindamycin (CLEOCIN) 300 MG capsule Take 1 capsule (300 mg total) by mouth 3 (three) times a day for 7 days, Starting Wed 10/11/2023, Until Wed 10/18/2023, Normal      fluconazole (DIFLUCAN) 150 mg tablet Take 1 tablet (150 mg total) by mouth daily for 2 doses 1 tab PO today then 1 tab PO in 72 hours, Starting Wed 10/11/2023, Until Fri 10/13/2023, Normal      naproxen (NAPROSYN) 500 mg tablet Take 1 tablet (500 mg total) by mouth 2 (two) times a day with meals for 7 days, Starting Wed 10/11/2023, Until Wed 10/18/2023, Normal         CONTINUE these medications which have NOT CHANGED    Details   albuterol (VENTOLIN HFA) 90 mcg/act inhaler Inhale 2 puffs every 4 (four) hours as needed for wheezing, Starting Fri 9/7/2018, Normal      norelgestromin-ethinyl estradiol (Xulane) 150-35 MCG/24HR APPLY 1 PATCH TO THE SKIN ONCE WEEKLY FOR THREE WEEKS THEN OFF FOR ONE WEEK, Normal         STOP taking these medications       clotrimazole (GYNE-LOTRIMIN) 1 % vaginal cream Comments:   Reason for Stopping:                   PDMP Review     None          ED Provider  Electronically Signed by           Queen Lizeth PA-C  10/11/23 0038

## 2023-10-12 ENCOUNTER — OFFICE VISIT (OUTPATIENT)
Dept: OBGYN CLINIC | Facility: CLINIC | Age: 23
End: 2023-10-12
Payer: COMMERCIAL

## 2023-10-12 VITALS
BODY MASS INDEX: 26.87 KG/M2 | WEIGHT: 146 LBS | HEIGHT: 62 IN | SYSTOLIC BLOOD PRESSURE: 124 MMHG | DIASTOLIC BLOOD PRESSURE: 74 MMHG

## 2023-10-12 DIAGNOSIS — N89.8 VAGINAL IRRITATION: Primary | ICD-10-CM

## 2023-10-12 PROCEDURE — 87510 GARDNER VAG DNA DIR PROBE: CPT | Performed by: PHYSICIAN ASSISTANT

## 2023-10-12 PROCEDURE — 99213 OFFICE O/P EST LOW 20 MIN: CPT | Performed by: PHYSICIAN ASSISTANT

## 2023-10-12 PROCEDURE — 87660 TRICHOMONAS VAGIN DIR PROBE: CPT | Performed by: PHYSICIAN ASSISTANT

## 2023-10-12 PROCEDURE — 87480 CANDIDA DNA DIR PROBE: CPT | Performed by: PHYSICIAN ASSISTANT

## 2023-10-12 NOTE — PROGRESS NOTES
Assessment/Plan:       Diagnoses and all orders for this visit:    Vaginal irritation  -     VAGINOSIS DNA PROBE (AFFIRM)     71-year-old female positive chlamydia screen 9/11 treated with Doxy and positive BV treated with metronidazole, no known reexposure; persistent symptoms retested 2 weeks later positive again and treated with 1 dose azithromycin and another course of doxycycline, negative  affirm. Patient treated with fluconazole x2 doses last week for possible yeast infection from antibiotics. Symptoms improved. Course complicated by dental infection requiring multiple antibiotics with return of vaginal irritation, mild and intermittent past few days. Patient prescribed clindamycin orally and fluconazole x2 in ED which she has not yet started. Exam today unremarkable    Plan:  Patient desire affirm  Would recommend starting clindamycin for dental infection but reassurance also may cover for BV infection. Would recommend taking 2 dose of fluconazole as well. We will call with culture results when available  I discouraged patient from considering agree testing for chlamydia and gonorrhea to avoid chance of false positive since no chance for reinfection and patient has completed 2 different courses of antibiotics. Discussed possibility of false positive because of early testing last time. Patient is agreeable to plan and will follow-up in December for test of cure as planned, will call office sooner if any problems. Chief Complaint   Patient presents with    Vaginitis        Subjective:      Patient ID: Janette Osman is a 26 y.o. female. 22y/o F here today for close f/u to recurrent chlamydia and intermittent vaginal irritation, wants to be checked. Irritation feels internal and intermittent, overall improved from previous. no vulvar sxs. She has not had any sexual intercourse. States discharge is normal. No odor. No pelvic pain. She denies urinary sxs. No abnormal vaginal bleeding. She was tx initially w/ doxy 9/11 for pos CT, as well as metronidazole for positive BV; returned for persistent sxs and requested early CT/GC testing 1.5 wks later which remained positive, affirm negative. She had then repeated tx with doxy and 1 dose azithromycin. She was also prescribed 2 doses of fluconazole last week which she reports sxs did improve until being on abx for her teeth a few days after and returned. Since then she also was started on PO abx from wisdom teeth infection. Recently prescribed clindamycin for the infection which she has not yet started. The following portions of the patient's history were reviewed and updated as appropriate: allergies, current medications, past family history, past medical history, past social history, past surgical history, and problem list.    Review of Systems   Constitutional: Negative. Gastrointestinal: Negative. Genitourinary:  Positive for vaginal pain (Intermittent irritation, improved from previous. ). Negative for difficulty urinating, dysuria, frequency, genital sores, hematuria, pelvic pain, urgency, vaginal bleeding and vaginal discharge. Objective:      /74 (BP Location: Left arm)   Ht 5' 2" (1.575 m)   Wt 66.2 kg (146 lb)   LMP 09/15/2023 (Approximate) Comment: patch  BMI 26.70 kg/m²          Physical Exam  Vitals reviewed. Constitutional:       Appearance: Normal appearance. Genitourinary:     General: Normal vulva. Labia:         Right: No rash, tenderness or lesion. Left: No rash, tenderness or lesion. Vagina: Vaginal discharge (Small amount of thin, mucousy white discharge within vagina without odor, appearing normal.) present. No erythema, tenderness, bleeding or lesions. Cervix: Normal.      Uterus: Not tender. Lymphadenopathy:      Lower Body: No right inguinal adenopathy. No left inguinal adenopathy.    Neurological:      Mental Status: She is alert and oriented to person, place, and time. Psychiatric:         Mood and Affect: Mood normal.         Behavior: Behavior normal. Behavior is cooperative.

## 2023-10-26 ENCOUNTER — CLINICAL SUPPORT (OUTPATIENT)
Dept: FAMILY MEDICINE CLINIC | Facility: CLINIC | Age: 23
End: 2023-10-26
Payer: COMMERCIAL

## 2023-10-26 DIAGNOSIS — Z23 ENCOUNTER FOR IMMUNIZATION: Primary | ICD-10-CM

## 2023-10-26 PROCEDURE — 90686 IIV4 VACC NO PRSV 0.5 ML IM: CPT | Performed by: FAMILY MEDICINE

## 2023-10-26 PROCEDURE — 90471 IMMUNIZATION ADMIN: CPT | Performed by: FAMILY MEDICINE

## 2023-10-27 ENCOUNTER — OFFICE VISIT (OUTPATIENT)
Dept: URGENT CARE | Age: 23
End: 2023-10-27
Payer: COMMERCIAL

## 2023-10-27 VITALS
HEART RATE: 85 BPM | TEMPERATURE: 97.5 F | OXYGEN SATURATION: 99 % | SYSTOLIC BLOOD PRESSURE: 138 MMHG | RESPIRATION RATE: 18 BRPM | DIASTOLIC BLOOD PRESSURE: 79 MMHG

## 2023-10-27 DIAGNOSIS — K04.7 DENTAL INFECTION: Primary | ICD-10-CM

## 2023-10-27 DIAGNOSIS — B37.31 VAGINAL CANDIDIASIS: ICD-10-CM

## 2023-10-27 PROCEDURE — 99214 OFFICE O/P EST MOD 30 MIN: CPT | Performed by: PHYSICIAN ASSISTANT

## 2023-10-27 PROCEDURE — S9088 SERVICES PROVIDED IN URGENT: HCPCS | Performed by: PHYSICIAN ASSISTANT

## 2023-10-27 RX ORDER — FLUCONAZOLE 150 MG/1
150 TABLET ORAL ONCE
Qty: 1 TABLET | Refills: 0 | Status: SHIPPED | OUTPATIENT
Start: 2023-10-27 | End: 2023-10-27

## 2023-10-27 RX ORDER — CLINDAMYCIN HYDROCHLORIDE 300 MG/1
300 CAPSULE ORAL 3 TIMES DAILY
Qty: 21 CAPSULE | Refills: 0 | Status: SHIPPED | OUTPATIENT
Start: 2023-10-27 | End: 2023-11-03

## 2023-10-27 NOTE — PATIENT INSTRUCTIONS
Take clindamycin as prescribed. Continue salt water rinses and flushing of your wisdom teeth cavities. Symptoms or not improved in 2 to 3 days follow-up with oral surgeon. If symptoms worsen or symptoms develop report to the emergency room immediately.

## 2023-10-27 NOTE — PROGRESS NOTES
[facility]        NAME: Vamshi Cordova is a 26 y.o. female  : 2000    MRN: [id number]  DATE: 2023  TIME: 6:46 PM    Assessment and Plan   Dental infection [K04.7]  1. Dental infection  clindamycin (CLEOCIN) 300 MG capsule      2. Vaginal candidiasis  fluconazole (DIFLUCAN) 150 mg tablet      Presents with symptoms and examination consistent with dental infection she has GI intolerance to amoxicillin recommend clindamycin to treat. Patient also has a history of vaginal candidiasis and will be started on fluconazole in the event that she develops symptoms. Instructed to follow-up with the dental office on Monday if symptoms or not improved. She will report to the emergency room if symptoms worsen or new symptoms develop. Patient Instructions     Patient Instructions   Take clindamycin as prescribed. Continue salt water rinses and flushing of your wisdom teeth cavities. Symptoms or not improved in 2 to 3 days follow-up with oral surgeon. If symptoms worsen or symptoms develop report to the emergency room immediately. Chief Complaint     Chief Complaint   Patient presents with    Dental Pain     Got wisdom teeth out last Monday and was never given any medication for pain or infection. Bottom left is giving her pain and swelling. History of Present Illness       72-year-old female presents with concern for possible dental infection. Patient reports that she had her wisdom teeth pulled the Monday before last.  She states she has been gradually having worsening pain in the area and developed jaw swelling over the last 2 to 3 days. She states she has reached out to the oral surgeon's office and explained the situation and they have not offered an appointment or any evaluation. She states that they did not schedule her a follow-up appointment after the teeth were pulled either.   Denies any fevers or chills but states that she is having slight trouble opening her mouth secondary to the pain and swelling. Dental Pain         Review of Systems   Review of Systems   HENT:  Positive for dental problem.           Current Medications       Current Outpatient Medications:     albuterol (VENTOLIN HFA) 90 mcg/act inhaler, Inhale 2 puffs every 4 (four) hours as needed for wheezing, Disp: 1 Inhaler, Rfl: 0    clindamycin (CLEOCIN) 300 MG capsule, Take 1 capsule (300 mg total) by mouth 3 (three) times a day for 7 days, Disp: 21 capsule, Rfl: 0    fluconazole (DIFLUCAN) 150 mg tablet, Take 1 tablet (150 mg total) by mouth once for 1 dose, Disp: 1 tablet, Rfl: 0    norelgestromin-ethinyl estradiol (Xulane) 150-35 MCG/24HR, APPLY 1 PATCH TO THE SKIN ONCE WEEKLY FOR THREE WEEKS THEN OFF FOR ONE WEEK, Disp: 9 patch, Rfl: 3    naproxen (NAPROSYN) 500 mg tablet, Take 1 tablet (500 mg total) by mouth 2 (two) times a day with meals for 7 days, Disp: 14 tablet, Rfl: 0    Current Allergies     Allergies as of 10/27/2023 - Reviewed 10/27/2023   Allergen Reaction Noted    Fruit & vegetable daily [fish oil - food allergy] GI Intolerance 2021    Molds & smuts Allergic Rhinitis 2014    Pollen extract Allergic Rhinitis 2014    Amoxicillin-pot clavulanate GI Intolerance 2018    Lorazepam Rash 2021            The following portions of the patient's history were reviewed and updated as appropriate: allergies, current medications, past family history, past medical history, past social history, past surgical history and problem list.     Past Medical History:   Diagnosis Date    Anxiety     Asthma     Chlamydia     Depression     Miscarriage     Scoliosis     Trichotillomania     Urogenital trichomoniasis        Past Surgical History:   Procedure Laterality Date    DILATION AND CURETTAGE OF UTERUS      NO PAST SURGERIES      THERAPEUTIC       Non Surgery- Pill Form       Family History   Problem Relation Age of Onset    Mental illness Mother     Endometriosis Mother Diabetes Father     Mental illness Father     No Known Problems Sister     Hypertension Maternal Grandmother     Endometriosis Maternal Grandmother     Alcohol abuse Maternal Grandfather     Mental illness Paternal Grandmother     Mental illness Paternal Grandfather     Stomach cancer Paternal Grandfather          Medications have been verified. Objective   /79   Pulse 85   Temp 97.5 °F (36.4 °C)   Resp 18   LMP 09/15/2023 (Approximate) Comment: patch  SpO2 99%   Patient's last menstrual period was 09/15/2023 (approximate). Physical Exam     Physical Exam  Vitals and nursing note reviewed. Constitutional:       General: She is awake. She is not in acute distress. Appearance: Normal appearance. She is well-developed and well-groomed. She is not ill-appearing, toxic-appearing or diaphoretic. HENT:      Head: Normocephalic and atraumatic. Right Ear: Hearing and external ear normal.      Left Ear: Hearing and external ear normal.      Mouth/Throat:        Comments: I was into the splint there is erythema and swelling of the gum with possible purulence noted. Patient is tender to the gum and surrounding area and the overlying jaw is swollen. Patient has limited range of motion of the mouth and is only able to open the jaw approximately 30 to 40 degrees. Eyes:      General: Lids are normal. Vision grossly intact. Gaze aligned appropriately. Cardiovascular:      Rate and Rhythm: Normal rate. Pulmonary:      Effort: Pulmonary effort is normal.      Comments: Patient is speaking in full sentences with no increased respiratory effort. No audible wheezing or stridor. Musculoskeletal:      Cervical back: Normal range of motion. Skin:     General: Skin is warm and dry. Neurological:      Mental Status: She is alert and oriented to person, place, and time. Coordination: Coordination is intact. Gait: Gait is intact.    Psychiatric:         Attention and Perception: Attention and perception normal.         Mood and Affect: Mood and affect normal.         Speech: Speech normal.         Behavior: Behavior normal. Behavior is cooperative. Note: Portions of this record may have been created with voice recognition software. Occasional wrong word or "sound a like" substitutions may have occurred due to the inherent limitations of voice recognition software. Please read the chart carefully and recognize, using context, where substitutions have occurred. *

## 2023-10-27 NOTE — LETTER
October 27, 2023     Patient: Raffy Hendricks   YOB: 2000   Date of Visit: 10/27/2023       To Whom It May Concern: It is my medical opinion that Tito Jacobs may return to work on 10/29/2023 . If you have any questions or concerns, please don't hesitate to call.          Sincerely,        Lesly Way PA-C    CC: No Recipients

## 2023-12-15 ENCOUNTER — OFFICE VISIT (OUTPATIENT)
Dept: FAMILY MEDICINE CLINIC | Facility: CLINIC | Age: 23
End: 2023-12-15
Payer: COMMERCIAL

## 2023-12-15 VITALS
BODY MASS INDEX: 25.58 KG/M2 | RESPIRATION RATE: 18 BRPM | HEART RATE: 74 BPM | SYSTOLIC BLOOD PRESSURE: 124 MMHG | WEIGHT: 139 LBS | HEIGHT: 62 IN | DIASTOLIC BLOOD PRESSURE: 82 MMHG | TEMPERATURE: 97.8 F | OXYGEN SATURATION: 98 %

## 2023-12-15 DIAGNOSIS — R07.9 CHEST PAIN OF UNKNOWN ETIOLOGY: Primary | ICD-10-CM

## 2023-12-15 PROCEDURE — 93000 ELECTROCARDIOGRAM COMPLETE: CPT | Performed by: FAMILY MEDICINE

## 2023-12-15 PROCEDURE — 99214 OFFICE O/P EST MOD 30 MIN: CPT | Performed by: FAMILY MEDICINE

## 2023-12-15 NOTE — PROGRESS NOTES
Name: Keke Ragland      : 2000      MRN: [id number]  Encounter Provider: Dylan Willis MD  Encounter Date: 12/15/2023   Encounter department: [address] [place] [address]. Chest pain of unknown etiology  -     XR chest pa & lateral; Future; Expected date: 12/15/2023  -     POCT ECG  -     CBC and differential; Future  -     Comprehensive metabolic panel; Future  -     Lipid panel; Future  -     TSH, 3rd generation with Free T4 reflex; Future    Could be a component of acid reflux. There is some midepigastric pain on examination. Patient does have a family history of cardiac disease. EKG in office was unremarkable. Normal sinus rhythm. Obtain chest x-ray. Will start patient on Pepcid 20 mg twice daily. Could be an anxiety component as well. If symptoms do not improve over the next few weeks can consider stress test.  Obtain lab work listed above. Patient understands and agrees with plan    BMI Counseling: Body mass index is 25.42 kg/m². The BMI is above normal. Nutrition recommendations include decreasing portion sizes, consuming healthier snacks, reducing intake of saturated and trans fat and reducing intake of cholesterol. Exercise recommendations include moderate physical activity 150 minutes/week. No pharmacotherapy was ordered. Patient referred to PCP. Rationale for BMI follow-up plan is due to patient being overweight or obese. I have spent a total time of 40 minutes on 12/15/23 in caring for this patient including Diagnostic results, Prognosis, Risks and benefits of tx options, Instructions for management, Patient and family education, Importance of tx compliance, Risk factor reductions, Impressions, Counseling / Coordination of care, Documenting in the medical record, and Reviewing / ordering tests, medicine, procedures  . Subjective      Patient presents with:  Chest Pain: A few months  Chest pain that feels like her bra is too tight.   Pain does not radiate to the back. Describes it as surface level. When she gets this chest pain she starts seeing black specks has numbness and weakness on the right side and this will last around 30 minutes. Still able to lift her arm but describes it as losing control. Started over the past year. Physically active job so the shortness of breath she sometimes experiences unsure if this is related to the chest pain or tightness. Of note she does have asthma. Patient experiences this discomfort with activity and rest.    Patient does not drink smoke or use any recreational drugs. Family history of heart disease     Chest Pain   This is a recurrent problem. The current episode started more than 1 month ago. The onset quality is sudden. The problem occurs every several days. The problem has been unchanged. The pain is present in the epigastric region. The pain is at a severity of 5/10. The pain is moderate. The quality of the pain is described as tightness and squeezing. The pain does not radiate. Associated symptoms include numbness and weakness. Pertinent negatives include no abdominal pain, back pain, cough, fever, headaches, nausea, palpitations or shortness of breath. The pain is aggravated by nothing. She has tried nothing for the symptoms. Her family medical history is significant for CAD, heart disease and hypertension. Review of Systems   Constitutional:  Negative for fatigue and fever. HENT:  Negative for sore throat. Eyes:  Positive for visual disturbance. Respiratory:  Negative for cough, chest tightness and shortness of breath. Cardiovascular:  Positive for chest pain. Negative for palpitations and leg swelling. Gastrointestinal:  Negative for abdominal pain, constipation, diarrhea and nausea. Endocrine: Negative for cold intolerance and heat intolerance. Genitourinary:  Negative for flank pain. Musculoskeletal:  Negative for back pain and neck pain. Skin:  Negative for rash. Neurological:  Positive for weakness and numbness. Negative for headaches. Psychiatric/Behavioral:  Negative for behavioral problems and confusion. Current Outpatient Medications on File Prior to Visit   Medication Sig   • albuterol (VENTOLIN HFA) 90 mcg/act inhaler Inhale 2 puffs every 4 (four) hours as needed for wheezing   • norelgestromin-ethinyl estradiol (Xulane) 150-35 MCG/24HR APPLY 1 PATCH TO THE SKIN ONCE WEEKLY FOR THREE WEEKS THEN OFF FOR ONE WEEK   • [DISCONTINUED] naproxen (NAPROSYN) 500 mg tablet Take 1 tablet (500 mg total) by mouth 2 (two) times a day with meals for 7 days       Objective     /82 (BP Location: Left arm, Patient Position: Sitting, Cuff Size: Standard)   Pulse 74   Temp 97.8 °F (36.6 °C) (Temporal)   Resp 18   Ht 5' 2" (1.575 m)   Wt 63 kg (139 lb)   SpO2 98%   BMI 25.42 kg/m²     Physical Exam  Vitals and nursing note reviewed. Constitutional:       Appearance: She is well-developed. HENT:      Head: Normocephalic and atraumatic. Cardiovascular:      Rate and Rhythm: Normal rate and regular rhythm. Heart sounds: Normal heart sounds. Pulmonary:      Effort: Pulmonary effort is normal.      Breath sounds: Normal breath sounds. Abdominal:      Palpations: Abdomen is soft. Tenderness: There is abdominal tenderness (Midepigastric tenderness-mild). Neurological:      General: No focal deficit present. Mental Status: She is alert.    Psychiatric:         Mood and Affect: Mood normal.         Behavior: Behavior normal.       Chema Alvarez MD

## 2023-12-18 DIAGNOSIS — T36.95XA ANTIBIOTIC-INDUCED YEAST INFECTION: Primary | ICD-10-CM

## 2023-12-18 DIAGNOSIS — B37.9 ANTIBIOTIC-INDUCED YEAST INFECTION: Primary | ICD-10-CM

## 2023-12-18 RX ORDER — FLUCONAZOLE 150 MG/1
150 TABLET ORAL ONCE
Qty: 2 TABLET | Refills: 0 | Status: SHIPPED | OUTPATIENT
Start: 2023-12-18 | End: 2023-12-18

## 2024-02-06 ENCOUNTER — APPOINTMENT (OUTPATIENT)
Dept: URGENT CARE | Age: 24
End: 2024-02-06

## 2024-02-20 ENCOUNTER — TELEPHONE (OUTPATIENT)
Age: 24
End: 2024-02-20

## 2024-02-20 NOTE — TELEPHONE ENCOUNTER
Patient is requesting a copy of her recent flu vaccine to be emailed to her and to include name, , date of vaccine and name of vaccine    She needs this urgently today  [email or web address]    Patient needs this today for her clinicals which is on 24   Oriented - self; Oriented - place; Oriented - time

## 2024-04-01 DIAGNOSIS — B37.31 VAGINAL YEAST INFECTION: Primary | ICD-10-CM

## 2024-04-01 RX ORDER — FLUCONAZOLE 150 MG/1
150 TABLET ORAL ONCE
Qty: 2 TABLET | Refills: 0 | Status: SHIPPED | OUTPATIENT
Start: 2024-04-01 | End: 2024-04-01

## 2024-04-04 ENCOUNTER — TELEPHONE (OUTPATIENT)
Age: 24
End: 2024-04-04

## 2024-04-04 NOTE — TELEPHONE ENCOUNTER
I called [facility] to clarify - pharmacy states patient told them she has new insurance and medication does need to be sent to mail order pharmacy.   I tried calling patient to find out what mail order pharmacy it needs to be sent to. Left voice mail to return call.

## 2024-04-04 NOTE — TELEPHONE ENCOUNTER
Patient called with her mail in pharmacy info.  She has [facility] member ID# [id number] - phone# [phone].

## 2024-04-04 NOTE — TELEPHONE ENCOUNTER
Patient called regarding her birth control patch prescription.  Her insurance said it has to go through the mail order pharmacy now or they will not cover it.  She asked us to send a script to the mail order pharmacy asap and call her when that is done.

## 2024-04-05 DIAGNOSIS — Z30.45 ENCOUNTER FOR SURVEILLANCE OF TRANSDERMAL PATCH HORMONAL CONTRACEPTIVE DEVICE: ICD-10-CM

## 2024-04-05 RX ORDER — NORELGESTROMIN AND ETHINYL ESTRADIOL 35; 150 UG/MG; UG/MG
PATCH TRANSDERMAL
Qty: 9 PATCH | Refills: 3 | Status: SHIPPED | OUTPATIENT
Start: 2024-04-05

## 2024-04-30 DIAGNOSIS — Z30.45 ENCOUNTER FOR SURVEILLANCE OF TRANSDERMAL PATCH HORMONAL CONTRACEPTIVE DEVICE: ICD-10-CM

## 2024-05-01 RX ORDER — NORELGESTROMIN AND ETHINYL ESTRADIOL 35; 150 UG/D; UG/D
PATCH TRANSDERMAL
Qty: 3 PATCH | Refills: 5 | Status: SHIPPED | OUTPATIENT
Start: 2024-05-01 | End: 2024-05-02 | Stop reason: SDUPTHER

## 2024-05-02 DIAGNOSIS — Z30.45 ENCOUNTER FOR SURVEILLANCE OF TRANSDERMAL PATCH HORMONAL CONTRACEPTIVE DEVICE: ICD-10-CM

## 2024-05-02 NOTE — TELEPHONE ENCOUNTER
Reason for call:   [x] Refill   [] Prior Auth  [] Other:     Office:   [] PCP/Provider -   [x] Specialty/Provider - OB GYN: Sudheer     Medication: norelgestromin-ethinyl estradiol (Zafemy)     Dose/Frequency: 150-35 mcg    Quantity: APPLY 1 PATCH TO THE SKIN WEEKLY FOR THREE WEEKS THEN OFF FOR 1 WEEK     Pharmacy: [facility] [address]    Does the patient have enough for 3 days?   [] Yes   [x] No - Send as HP to POD

## 2024-05-03 RX ORDER — NORELGESTROMIN AND ETHINYL ESTRADIOL 35; 150 UG/MG; UG/MG
PATCH TRANSDERMAL
Qty: 3 PATCH | Refills: 5 | Status: SHIPPED | OUTPATIENT
Start: 2024-05-03

## 2024-05-21 ENCOUNTER — TELEPHONE (OUTPATIENT)
Age: 24
End: 2024-05-21

## 2024-05-21 NOTE — TELEPHONE ENCOUNTER
Pt called to check on status of FMLA forms for school that she had dropped off.  Spoke with Nestor, who stated they will be ready for pickup any time after 9:00 tomorrow.  She said she has to check, but there may be a fee and it may be about $15.00.  Made pt aware of the fee and when she may  the forms.

## 2024-06-05 DIAGNOSIS — Z30.45 ENCOUNTER FOR SURVEILLANCE OF TRANSDERMAL PATCH HORMONAL CONTRACEPTIVE DEVICE: ICD-10-CM

## 2024-06-05 RX ORDER — NORELGESTROMIN AND ETHINYL ESTRADIOL 35; 150 UG/MG; UG/MG
PATCH TRANSDERMAL
Qty: 3 PATCH | Refills: 3 | Status: SHIPPED | OUTPATIENT
Start: 2024-06-05 | End: 2024-06-05 | Stop reason: SDUPTHER

## 2024-06-05 RX ORDER — NORELGESTROMIN AND ETHINYL ESTRADIOL 35; 150 UG/MG; UG/MG
PATCH TRANSDERMAL
Qty: 9 PATCH | Refills: 3 | Status: SHIPPED | OUTPATIENT
Start: 2024-06-05

## 2024-06-14 ENCOUNTER — OFFICE VISIT (OUTPATIENT)
Dept: OBGYN CLINIC | Facility: CLINIC | Age: 24
End: 2024-06-14
Payer: COMMERCIAL

## 2024-06-14 VITALS
BODY MASS INDEX: 24.14 KG/M2 | DIASTOLIC BLOOD PRESSURE: 82 MMHG | WEIGHT: 131.2 LBS | SYSTOLIC BLOOD PRESSURE: 126 MMHG | HEIGHT: 62 IN

## 2024-06-14 DIAGNOSIS — R10.2 PELVIC PAIN: Primary | ICD-10-CM

## 2024-06-14 DIAGNOSIS — N92.1 BREAKTHROUGH BLEEDING ON CONTRACEPTIVE PATCH: ICD-10-CM

## 2024-06-14 DIAGNOSIS — N89.8 VAGINAL DISCHARGE: ICD-10-CM

## 2024-06-14 PROCEDURE — 87660 TRICHOMONAS VAGIN DIR PROBE: CPT | Performed by: OBSTETRICS & GYNECOLOGY

## 2024-06-14 PROCEDURE — 87491 CHLMYD TRACH DNA AMP PROBE: CPT | Performed by: OBSTETRICS & GYNECOLOGY

## 2024-06-14 PROCEDURE — 99213 OFFICE O/P EST LOW 20 MIN: CPT | Performed by: OBSTETRICS & GYNECOLOGY

## 2024-06-14 PROCEDURE — 87510 GARDNER VAG DNA DIR PROBE: CPT | Performed by: OBSTETRICS & GYNECOLOGY

## 2024-06-14 PROCEDURE — 87480 CANDIDA DNA DIR PROBE: CPT | Performed by: OBSTETRICS & GYNECOLOGY

## 2024-06-14 PROCEDURE — 87591 N.GONORRHOEAE DNA AMP PROB: CPT | Performed by: OBSTETRICS & GYNECOLOGY
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
## 2024-06-15 LAB
CANDIDA RRNA VAG QL PROBE: DETECTED
G VAGINALIS RRNA GENITAL QL PROBE: DETECTED
T VAGINALIS RRNA GENITAL QL PROBE: NOT DETECTED

## 2024-06-17 ENCOUNTER — TELEPHONE (OUTPATIENT)
Age: 24
End: 2024-06-17

## 2024-06-17 LAB
C TRACH DNA SPEC QL NAA+PROBE: POSITIVE
N GONORRHOEA DNA SPEC QL NAA+PROBE: NEGATIVE

## 2024-06-17 NOTE — TELEPHONE ENCOUNTER
Patient called again stating she had a negative Chlamydia/GC done elsewhere 3 months ago and it was negative.  She has been with the same person for the last 8 months and he also tested negative in the last 3 months.  She would like to know if it could be a false positive.  Results not discussed with patient due to not being reviewed by provider.  Explained concerns would be relayed to provider and once reviewed could be discussed.  She understood and had no further questions or concerns at this time.

## 2024-06-17 NOTE — TELEPHONE ENCOUNTER
Patient called she had seen her vaginosis dna probe results in my chart and wanted to see if something could be called in for her. I did let patient know that these results needed to be reviewed by the provider. Patient can be reached at [phone] and she would like any medication sent to the [facility] pharmacy listed in her chart.

## 2024-06-18 DIAGNOSIS — B96.89 BV (BACTERIAL VAGINOSIS): ICD-10-CM

## 2024-06-18 DIAGNOSIS — N76.0 BV (BACTERIAL VAGINOSIS): ICD-10-CM

## 2024-06-18 DIAGNOSIS — A74.9 CHLAMYDIA: Primary | ICD-10-CM

## 2024-06-18 DIAGNOSIS — B37.31 CANDIDA VAGINITIS: ICD-10-CM

## 2024-06-18 RX ORDER — FLUCONAZOLE 150 MG/1
150 TABLET ORAL
Qty: 2 TABLET | Refills: 0 | Status: SHIPPED | OUTPATIENT
Start: 2024-06-18 | End: 2024-06-22

## 2024-06-18 RX ORDER — DOXYCYCLINE 100 MG/1
100 TABLET ORAL 2 TIMES DAILY
Qty: 14 TABLET | Refills: 0 | Status: SHIPPED | OUTPATIENT
Start: 2024-06-18 | End: 2024-06-25

## 2024-06-18 RX ORDER — METRONIDAZOLE 500 MG/1
500 TABLET ORAL EVERY 12 HOURS SCHEDULED
Qty: 14 TABLET | Refills: 0 | Status: SHIPPED | OUTPATIENT
Start: 2024-06-18 | End: 2024-06-25

## 2024-06-19 ENCOUNTER — OFFICE VISIT (OUTPATIENT)
Dept: FAMILY MEDICINE CLINIC | Facility: CLINIC | Age: 24
End: 2024-06-19
Payer: COMMERCIAL

## 2024-06-19 VITALS
BODY MASS INDEX: 24.2 KG/M2 | DIASTOLIC BLOOD PRESSURE: 68 MMHG | WEIGHT: 131.5 LBS | OXYGEN SATURATION: 98 % | HEART RATE: 94 BPM | SYSTOLIC BLOOD PRESSURE: 112 MMHG | TEMPERATURE: 98 F | RESPIRATION RATE: 16 BRPM | HEIGHT: 62 IN

## 2024-06-19 DIAGNOSIS — F90.2 ATTENTION DEFICIT HYPERACTIVITY DISORDER (ADHD), COMBINED TYPE: Primary | ICD-10-CM

## 2024-06-19 PROCEDURE — 99214 OFFICE O/P EST MOD 30 MIN: CPT | Performed by: FAMILY MEDICINE

## 2024-06-19 RX ORDER — DEXTROAMPHETAMINE SACCHARATE, AMPHETAMINE ASPARTATE MONOHYDRATE, DEXTROAMPHETAMINE SULFATE AND AMPHETAMINE SULFATE 5; 5; 5; 5 MG/1; MG/1; MG/1; MG/1
20 CAPSULE, EXTENDED RELEASE ORAL EVERY MORNING
Qty: 30 CAPSULE | Refills: 0 | Status: SHIPPED | OUTPATIENT
Start: 2024-06-19 | End: 2024-07-19

## 2024-06-19 NOTE — PROGRESS NOTES
Ambulatory Visit  Name: Amber Boogie      : 2000      MRN: [id number]  Encounter Provider: Chrissy Armenta MD  Encounter Date: 2024   Encounter department: [facility]    Assessment & Plan   1. Attention deficit hyperactivity disorder (ADHD), combined type  -     amphetamine-dextroamphetamine (ADDERALL XR, 20MG,) 20 MG 24 hr capsule; Take 1 capsule (20 mg total) by mouth every morning Max Daily Amount: 20 mg    Plan to start patient on Adderall XR 20 mg daily.  Trial patient on medication and assess response in 3 months.  Will sign controlled substance agreement and urine test at 3-month follow-up.    I have spent a total time of 30 minutes on 24 in caring for this patient including Diagnostic results, Prognosis, Risks and benefits of tx options, Instructions for management, Patient and family education, Impressions, Counseling / Coordination of care, Documenting in the medical record, Reviewing / ordering tests, medicine, procedures  , and Obtaining or reviewing history  .       History of Present Illness     Previously on ADHD medication when she was in high school.  She stopped taking it at 17 after moving out. Previously followed by [facility].   Previously took hydroxyzine for panic attacks.        Review of Systems   Constitutional:  Negative for activity change, fatigue and fever.   Eyes:  Negative for visual disturbance.   Respiratory:  Negative for shortness of breath.    Cardiovascular:  Negative for chest pain.   Gastrointestinal:  Negative for abdominal pain, constipation, diarrhea and nausea.   Endocrine: Negative for cold intolerance and heat intolerance.   Musculoskeletal:  Negative for back pain.   Skin:  Negative for rash.   Neurological:  Negative for headaches.   Psychiatric/Behavioral:  Positive for decreased concentration. Negative for confusion.      Medical History Reviewed by provider this encounter:  Tobacco  Allergies  Meds  Problems  Med Hx  " Surg Hx  Fam Hx       Current Outpatient Medications on File Prior to Visit   Medication Sig Dispense Refill    albuterol (VENTOLIN HFA) 90 mcg/act inhaler Inhale 2 puffs every 4 (four) hours as needed for wheezing 1 Inhaler 0    norelgestromin-ethinyl estradiol (Zafemy) 150-35 MCG/24HR One patch weekly for 3 weeks and then off for 1 week. 3-month supply mail order. 9 patch 3    doxycycline (ADOXA) 100 MG tablet Take 1 tablet (100 mg total) by mouth 2 (two) times a day for 7 days (Patient not taking: Reported on 6/19/2024) 14 tablet 0    fluconazole (DIFLUCAN) 150 mg tablet Take 1 tablet (150 mg total) by mouth every third day for 2 doses (Patient not taking: Reported on 6/19/2024) 2 tablet 0    metroNIDAZOLE (FLAGYL) 500 mg tablet Take 1 tablet (500 mg total) by mouth every 12 (twelve) hours for 7 days (Patient not taking: Reported on 6/19/2024) 14 tablet 0     No current facility-administered medications on file prior to visit.      Social History     Tobacco Use    Smoking status: Never    Smokeless tobacco: Never   Vaping Use    Vaping status: Never Used   Substance and Sexual Activity    Alcohol use: Never    Drug use: Never    Sexual activity: Yes     Partners: Female, Male     Birth control/protection: Patch     Objective     /68 (BP Location: Left arm, Patient Position: Sitting, Cuff Size: Large)   Pulse 94   Temp 98 °F (36.7 °C) (Temporal)   Resp 16   Ht 5' 2\" (1.575 m)   Wt 59.6 kg (131 lb 8 oz)   LMP 05/26/2024   SpO2 98%   BMI 24.05 kg/m²     Physical Exam  Vitals and nursing note reviewed.   Constitutional:       Appearance: Normal appearance. She is well-developed.   HENT:      Head: Normocephalic and atraumatic.   Eyes:      Extraocular Movements: Extraocular movements intact.      Pupils: Pupils are equal, round, and reactive to light.   Cardiovascular:      Rate and Rhythm: Normal rate and regular rhythm.   Pulmonary:      Effort: Pulmonary effort is normal.      Breath sounds: " Normal breath sounds.   Abdominal:      General: Bowel sounds are normal.      Palpations: Abdomen is soft.   Musculoskeletal:      Cervical back: Normal range of motion.   Skin:     General: Skin is warm and dry.   Neurological:      General: No focal deficit present.      Mental Status: She is alert and oriented to person, place, and time.   Psychiatric:         Mood and Affect: Mood normal.         Speech: Speech normal.         Behavior: Behavior normal.       Administrative Statements

## 2024-07-01 DIAGNOSIS — T36.95XA ANTIBIOTIC-INDUCED YEAST INFECTION: Primary | ICD-10-CM

## 2024-07-01 DIAGNOSIS — B37.9 ANTIBIOTIC-INDUCED YEAST INFECTION: Primary | ICD-10-CM

## 2024-07-01 RX ORDER — FLUCONAZOLE 150 MG/1
150 TABLET ORAL ONCE
Qty: 1 TABLET | Refills: 0 | Status: SHIPPED | OUTPATIENT
Start: 2024-07-01 | End: 2024-07-01

## 2024-07-15 DIAGNOSIS — F90.2 ATTENTION DEFICIT HYPERACTIVITY DISORDER (ADHD), COMBINED TYPE: ICD-10-CM

## 2024-07-16 RX ORDER — DEXTROAMPHETAMINE SACCHARATE, AMPHETAMINE ASPARTATE MONOHYDRATE, DEXTROAMPHETAMINE SULFATE AND AMPHETAMINE SULFATE 5; 5; 5; 5 MG/1; MG/1; MG/1; MG/1
20 CAPSULE, EXTENDED RELEASE ORAL EVERY MORNING
Qty: 30 CAPSULE | Refills: 0 | Status: SHIPPED | OUTPATIENT
Start: 2024-07-16 | End: 2024-08-15

## 2024-07-16 NOTE — TELEPHONE ENCOUNTER
1 [id number] 06/19/2024 06/19/2024 Mixed Amphetamine Salts (Capsule, Extended Release) 30.0 30 20 MG NA JESSICA PAYNE [place]. Commercial Insurance 0 / 0 [place]

## 2024-08-15 DIAGNOSIS — F90.2 ATTENTION DEFICIT HYPERACTIVITY DISORDER (ADHD), COMBINED TYPE: ICD-10-CM

## 2024-08-19 RX ORDER — DEXTROAMPHETAMINE SACCHARATE, AMPHETAMINE ASPARTATE MONOHYDRATE, DEXTROAMPHETAMINE SULFATE AND AMPHETAMINE SULFATE 5; 5; 5; 5 MG/1; MG/1; MG/1; MG/1
20 CAPSULE, EXTENDED RELEASE ORAL EVERY MORNING
Qty: 30 CAPSULE | Refills: 0 | Status: SHIPPED | OUTPATIENT
Start: 2024-08-19 | End: 2024-08-21 | Stop reason: SDUPTHER

## 2024-08-19 NOTE — TELEPHONE ENCOUNTER
1 [id number] 07/16/2024 07/16/2024 Mixed Amphetamine Salts (Capsule, Extended Release) 30.0 30 20 MG NA JESSICA PAYNE [place]. Commercial Insurance 0 / 0 [place]

## 2024-08-19 NOTE — TELEPHONE ENCOUNTER
Pt called because her pharmacy did not have her Rx in stock.  She will check around to see who had it and call back to have the Rx re-sent.

## 2024-08-19 NOTE — TELEPHONE ENCOUNTER
[facility] has adderal generic in stock Please send EMRE NEAL [address] Please call when sent to pharmacy

## 2024-08-20 NOTE — TELEPHONE ENCOUNTER
Patient called back to request the medication to be sent to [place] [id number] [facility] as [facility] does not have it in stock.  Patient stated she has been without the medication for 3 days now.  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  She would like to know what is recommended if she is not able to get the medication.  Please advise.

## 2024-08-21 DIAGNOSIS — F90.2 ATTENTION DEFICIT HYPERACTIVITY DISORDER (ADHD), COMBINED TYPE: ICD-10-CM

## 2024-08-21 RX ORDER — DEXTROAMPHETAMINE SACCHARATE, AMPHETAMINE ASPARTATE MONOHYDRATE, DEXTROAMPHETAMINE SULFATE AND AMPHETAMINE SULFATE 5; 5; 5; 5 MG/1; MG/1; MG/1; MG/1
20 CAPSULE, EXTENDED RELEASE ORAL EVERY MORNING
Qty: 30 CAPSULE | Refills: 0 | Status: SHIPPED | OUTPATIENT
Start: 2024-08-21 | End: 2024-08-23

## 2024-08-21 NOTE — TELEPHONE ENCOUNTER
Pt stated this is her 3rd attempt requesting a refill for the amphetamine-dextroamphetamine (ADDERALL XR, 20MG,) 20 MG 24 hr capsule on 8/15/24 and since has not received her meds.  [facility] didn't have the med but [facility] does. Pt stated she requested Adderall  to be sent to [facility] but it was not sent to the correct pharmacy. Pt is out of med.       Please send Adderall or generic version to:   [facility]  [address] [address]        Please contact pt when this is done as she has been without med for days. Thank you for your help.

## 2024-08-21 NOTE — TELEPHONE ENCOUNTER
Spoke with patient , [facility] doesn't have Adderall , please send script to [facility].

## 2024-08-23 DIAGNOSIS — F90.2 ATTENTION DEFICIT HYPERACTIVITY DISORDER (ADHD), COMBINED TYPE: ICD-10-CM

## 2024-08-23 RX ORDER — DEXTROAMPHETAMINE SACCHARATE, AMPHETAMINE ASPARTATE MONOHYDRATE, DEXTROAMPHETAMINE SULFATE AND AMPHETAMINE SULFATE 5; 5; 5; 5 MG/1; MG/1; MG/1; MG/1
20 CAPSULE, EXTENDED RELEASE ORAL EVERY MORNING
Qty: 30 CAPSULE | Refills: 0 | Status: SHIPPED | OUTPATIENT
Start: 2024-08-23 | End: 2024-09-22

## 2024-09-16 ENCOUNTER — TELEPHONE (OUTPATIENT)
Age: 24
End: 2024-09-16

## 2024-09-16 NOTE — TELEPHONE ENCOUNTER
Patient called office stating she has her period and has appointment scheduled Wednesday, 9/18 for STD testing. She would like to know if she can still have STD testing done with her period. Advised She can have the testing done if period is light, but should reschedule if it is heavy. She will call Wednesday morning if period if heavy to reschedule.

## 2024-09-18 DIAGNOSIS — F90.2 ATTENTION DEFICIT HYPERACTIVITY DISORDER (ADHD), COMBINED TYPE: ICD-10-CM

## 2024-09-18 RX ORDER — DEXTROAMPHETAMINE SACCHARATE, AMPHETAMINE ASPARTATE MONOHYDRATE, DEXTROAMPHETAMINE SULFATE AND AMPHETAMINE SULFATE 5; 5; 5; 5 MG/1; MG/1; MG/1; MG/1
20 CAPSULE, EXTENDED RELEASE ORAL EVERY MORNING
Qty: 30 CAPSULE | Refills: 0 | Status: SHIPPED | OUTPATIENT
Start: 2024-09-18 | End: 2024-10-18

## 2024-09-18 NOTE — TELEPHONE ENCOUNTER
1 [id number] 08/23/2024 08/23/2024 Mixed Amphetamine Salts (Capsule, Extended Release) 30.0 30 20 MG NA JESSICA PAYNE [facility] Commercial Insurance 0 / 0 [place]

## 2024-09-19 ENCOUNTER — OFFICE VISIT (OUTPATIENT)
Dept: FAMILY MEDICINE CLINIC | Facility: CLINIC | Age: 24
End: 2024-09-19
Payer: COMMERCIAL

## 2024-09-19 VITALS
TEMPERATURE: 97.9 F | RESPIRATION RATE: 18 BRPM | HEART RATE: 102 BPM | OXYGEN SATURATION: 100 % | DIASTOLIC BLOOD PRESSURE: 76 MMHG | HEIGHT: 62 IN | WEIGHT: 121 LBS | SYSTOLIC BLOOD PRESSURE: 120 MMHG | BODY MASS INDEX: 22.26 KG/M2

## 2024-09-19 DIAGNOSIS — Z00.00 ANNUAL PHYSICAL EXAM: Primary | ICD-10-CM

## 2024-09-19 DIAGNOSIS — F90.9 LONG-TERM CURRENT USE OF DRUG THERAPY FOR ATTENTION DEFICIT HYPERACTIVITY DISORDER (ADHD): ICD-10-CM

## 2024-09-19 DIAGNOSIS — F90.2 ATTENTION DEFICIT HYPERACTIVITY DISORDER (ADHD), COMBINED TYPE: ICD-10-CM

## 2024-09-19 DIAGNOSIS — Z79.899 LONG-TERM CURRENT USE OF DRUG THERAPY FOR ATTENTION DEFICIT HYPERACTIVITY DISORDER (ADHD): ICD-10-CM

## 2024-09-19 PROCEDURE — 99213 OFFICE O/P EST LOW 20 MIN: CPT | Performed by: FAMILY MEDICINE

## 2024-09-19 PROCEDURE — 99395 PREV VISIT EST AGE 18-39: CPT | Performed by: FAMILY MEDICINE

## 2024-09-19 NOTE — ASSESSMENT & PLAN NOTE
Continue adderall xr 20 mg. Tolerating medication well.  Continue with 3m follow ups for controlled substance.  Controlled substance agreement completed today.

## 2024-09-19 NOTE — PROGRESS NOTES
Adult Annual Physical  Name: Amber Boogie      : 2000      MRN: [id number]  Encounter Provider: Chrissy Armenta MD  Encounter Date: 2024   Encounter department: [facility]    Assessment & Plan  Annual physical exam         Attention deficit hyperactivity disorder (ADHD), combined type  Continue adderall xr 20 mg. Tolerating medication well.  Continue with 3m follow ups for controlled substance.  Controlled substance agreement completed today.          Annual physical completed today   Counseled on diet and exercise   Continue following with Obgyn   Opiate agreement and UDS completed today for ADHD medication.  Continue routine follow-up every 3 months    Immunizations and preventive care screenings were discussed with patient today. Appropriate education was printed on patient's after visit summary.    Counseling:  Alcohol/drug use: discussed moderation in alcohol intake, the recommendations for healthy alcohol use, and avoidance of illicit drug use.  Dental Health: discussed importance of regular tooth brushing, flossing, and dental visits.  Injury prevention: discussed safety/seat belts, safety helmets, smoke detectors, carbon dioxide detectors, and smoking near bedding or upholstery.  Sexual health: discussed sexually transmitted diseases, partner selection, use of condoms, avoidance of unintended pregnancy, and contraceptive alternatives.  Exercise: the importance of regular exercise/physical activity was discussed. Recommend exercise 3-5 times per week for at least 30 minutes.          History of Present Illness     Adult Annual Physical:  Patient presents for annual physical. Annual physical   Started on adderall XR and tolerating well .     Diet and Physical Activity:  - Diet/Nutrition: consuming 3-5 servings of fruits/vegetables daily and poor diet. Working on improving diet. Discussed having 3 meals per day  - Exercise: walking.    Depression Screening:  - PHQ-2 Score:  "0    General Health:  - Sleep: sleeps well.  - Hearing: normal hearing bilateral ears.  - Vision: no vision problems.  - Dental: regular dental visits.    /GYN Health:  - Follows with GYN: yes.   - History of STDs: yes    Review of Systems   Constitutional:  Negative for activity change, fatigue and fever.   Eyes:  Negative for visual disturbance.   Respiratory:  Negative for shortness of breath.    Cardiovascular:  Negative for chest pain.   Gastrointestinal:  Negative for abdominal pain, constipation, diarrhea and nausea.   Endocrine: Negative for cold intolerance and heat intolerance.   Musculoskeletal:  Negative for back pain.   Skin:  Negative for rash.   Neurological:  Negative for headaches.   Psychiatric/Behavioral:  Negative for confusion.      Medical History Reviewed by provider this encounter:  Tobacco  Allergies  Meds  Problems  Med Hx  Surg Hx  Fam Hx       Current Outpatient Medications on File Prior to Visit   Medication Sig Dispense Refill    albuterol (VENTOLIN HFA) 90 mcg/act inhaler Inhale 2 puffs every 4 (four) hours as needed for wheezing 1 Inhaler 0    amphetamine-dextroamphetamine (ADDERALL XR, 20MG,) 20 MG 24 hr capsule Take 1 capsule (20 mg total) by mouth every morning Max Daily Amount: 20 mg 30 capsule 0    norelgestromin-ethinyl estradiol (Zafemy) 150-35 MCG/24HR One patch weekly for 3 weeks and then off for 1 week. 3-month supply mail order. 9 patch 3     No current facility-administered medications on file prior to visit.      Social History     Tobacco Use    Smoking status: Never    Smokeless tobacco: Never   Vaping Use    Vaping status: Never Used   Substance and Sexual Activity    Alcohol use: Never    Drug use: Never    Sexual activity: Yes     Partners: Female, Male     Birth control/protection: Patch       Objective     /76 (BP Location: Left arm, Patient Position: Sitting, Cuff Size: Standard)   Pulse 102   Temp 97.9 °F (36.6 °C)   Resp 18   Ht 5' 2\" (1.575 " m)   Wt 54.9 kg (121 lb)   SpO2 100%   BMI 22.13 kg/m²     Physical Exam  Vitals and nursing note reviewed.   Constitutional:       Appearance: Normal appearance. She is well-developed.   HENT:      Head: Normocephalic and atraumatic.   Cardiovascular:      Rate and Rhythm: Normal rate and regular rhythm.   Pulmonary:      Effort: Pulmonary effort is normal.      Breath sounds: Normal breath sounds.   Abdominal:      General: Bowel sounds are normal.      Palpations: Abdomen is soft.   Musculoskeletal:      Cervical back: Normal range of motion.   Skin:     General: Skin is warm.   Neurological:      General: No focal deficit present.      Mental Status: She is alert.   Psychiatric:         Mood and Affect: Mood normal.         Speech: Speech normal.

## 2024-09-25 LAB

## 2024-09-26 ENCOUNTER — OFFICE VISIT (OUTPATIENT)
Dept: OBGYN CLINIC | Facility: CLINIC | Age: 24
End: 2024-09-26
Payer: COMMERCIAL

## 2024-09-26 VITALS
SYSTOLIC BLOOD PRESSURE: 120 MMHG | WEIGHT: 121 LBS | BODY MASS INDEX: 22.26 KG/M2 | HEIGHT: 62 IN | DIASTOLIC BLOOD PRESSURE: 78 MMHG

## 2024-09-26 DIAGNOSIS — A74.9 CHLAMYDIA INFECTION: Primary | ICD-10-CM

## 2024-09-26 PROCEDURE — 99213 OFFICE O/P EST LOW 20 MIN: CPT | Performed by: PHYSICIAN ASSISTANT

## 2024-09-26 PROCEDURE — 87491 CHLMYD TRACH DNA AMP PROBE: CPT | Performed by: PHYSICIAN ASSISTANT

## 2024-09-26 PROCEDURE — 87591 N.GONORRHOEAE DNA AMP PROB: CPT | Performed by: PHYSICIAN ASSISTANT

## 2024-09-26 NOTE — PROGRESS NOTES
"Assessment/Plan:      Diagnoses and all orders for this visit:    Chlamydia infection  -     Chlamydia/GC amplified DNA by PCR        GC/chlamydia screening done; we will call with results.  F/u for yearly gyn exam.  Call with problems in the interim.    Subjective:     Patient ID: Amber Boogie is a 24 y.o. female.    Patient is here for test of cure for chlamydia infection from June.  States she finished her abx.  Denies urinary symptoms, vaginal discharge, odor, itching, burning, pelvic pain, abdominal pain, n/v, and fever/chills.  She is sexually active with a single partner.  Had prior chlamydial infection September 2023.  Has history of SA.  Due for Pap.        Review of Systems   Constitutional:  Negative for chills and fever.   Gastrointestinal:  Negative for abdominal distention, abdominal pain, nausea and vomiting.   Genitourinary:  Negative for difficulty urinating, dysuria, frequency, genital sores, hematuria, menstrual problem, pelvic pain, urgency, vaginal bleeding, vaginal discharge and vaginal pain.         Objective:  Visit Vitals  /78 (BP Location: Left arm, Patient Position: Sitting, Cuff Size: Adult)   Ht 5' 2\" (1.575 m)   Wt 54.9 kg (121 lb)   LMP 09/06/2024 (Approximate)   BMI 22.13 kg/m²   OB Status Having periods   Smoking Status Never   BSA 1.54 m²         Physical Exam  Vitals reviewed. Exam conducted with a chaperone present.   Constitutional:       Appearance: Normal appearance. She is well-developed and normal weight.   Genitourinary:     General: Normal vulva.      Pubic Area: No rash.       Labia:         Right: No rash, tenderness, lesion or injury.         Left: No rash, tenderness, lesion or injury.       Vagina: Normal. No vaginal discharge, erythema, tenderness or bleeding.      Cervix: Normal.      Uterus: Normal.       Adnexa: Right adnexa normal and left adnexa normal.        Right: No mass, tenderness or fullness.          Left: No mass, tenderness or fullness.   "   Lymphadenopathy:      Lower Body: No right inguinal adenopathy. No left inguinal adenopathy.   Skin:     General: Skin is warm and dry.   Neurological:      Mental Status: She is alert and oriented to person, place, and time.   Psychiatric:         Mood and Affect: Mood normal.         Behavior: Behavior normal. Behavior is cooperative.         Thought Content: Thought content normal.         Judgment: Judgment normal.

## 2024-09-28 LAB
C TRACH DNA SPEC QL NAA+PROBE: NEGATIVE
N GONORRHOEA DNA SPEC QL NAA+PROBE: NEGATIVE

## 2024-10-17 DIAGNOSIS — F90.2 ATTENTION DEFICIT HYPERACTIVITY DISORDER (ADHD), COMBINED TYPE: ICD-10-CM

## 2024-10-18 RX ORDER — DEXTROAMPHETAMINE SACCHARATE, AMPHETAMINE ASPARTATE MONOHYDRATE, DEXTROAMPHETAMINE SULFATE AND AMPHETAMINE SULFATE 5; 5; 5; 5 MG/1; MG/1; MG/1; MG/1
20 CAPSULE, EXTENDED RELEASE ORAL EVERY MORNING
Qty: 30 CAPSULE | Refills: 0 | Status: SHIPPED | OUTPATIENT
Start: 2024-10-18 | End: 2024-11-17

## 2024-11-13 DIAGNOSIS — R05.9 COUGH: ICD-10-CM

## 2024-11-15 RX ORDER — ALBUTEROL SULFATE 90 UG/1
2 INHALANT RESPIRATORY (INHALATION) EVERY 4 HOURS PRN
Qty: 8 G | Refills: 0 | Status: SHIPPED | OUTPATIENT
Start: 2024-11-15

## 2024-11-17 DIAGNOSIS — F90.2 ATTENTION DEFICIT HYPERACTIVITY DISORDER (ADHD), COMBINED TYPE: ICD-10-CM

## 2024-11-18 RX ORDER — DEXTROAMPHETAMINE SACCHARATE, AMPHETAMINE ASPARTATE MONOHYDRATE, DEXTROAMPHETAMINE SULFATE AND AMPHETAMINE SULFATE 5; 5; 5; 5 MG/1; MG/1; MG/1; MG/1
20 CAPSULE, EXTENDED RELEASE ORAL EVERY MORNING
Qty: 30 CAPSULE | Refills: 0 | Status: SHIPPED | OUTPATIENT
Start: 2024-11-18 | End: 2024-12-18

## 2024-12-19 ENCOUNTER — OFFICE VISIT (OUTPATIENT)
Dept: FAMILY MEDICINE CLINIC | Facility: CLINIC | Age: 24
End: 2024-12-19
Payer: COMMERCIAL

## 2024-12-19 VITALS
SYSTOLIC BLOOD PRESSURE: 116 MMHG | BODY MASS INDEX: 21.99 KG/M2 | TEMPERATURE: 97.1 F | WEIGHT: 119.5 LBS | DIASTOLIC BLOOD PRESSURE: 76 MMHG | HEIGHT: 62 IN | HEART RATE: 98 BPM | RESPIRATION RATE: 16 BRPM | OXYGEN SATURATION: 99 %

## 2024-12-19 DIAGNOSIS — F90.2 ATTENTION DEFICIT HYPERACTIVITY DISORDER (ADHD), COMBINED TYPE: Primary | ICD-10-CM

## 2024-12-19 PROCEDURE — 99213 OFFICE O/P EST LOW 20 MIN: CPT | Performed by: FAMILY MEDICINE

## 2024-12-19 RX ORDER — DEXTROAMPHETAMINE SACCHARATE, AMPHETAMINE ASPARTATE MONOHYDRATE, DEXTROAMPHETAMINE SULFATE AND AMPHETAMINE SULFATE 5; 5; 5; 5 MG/1; MG/1; MG/1; MG/1
20 CAPSULE, EXTENDED RELEASE ORAL EVERY MORNING
Qty: 30 CAPSULE | Refills: 0 | Status: SHIPPED | OUTPATIENT
Start: 2024-12-19 | End: 2025-01-18

## 2024-12-19 NOTE — ASSESSMENT & PLAN NOTE
Continue adderall xr 20 mg. Tolerating medication well.  Continue with 3m follow ups for controlled substance.  Controlled substance agreement up-to-date.  Recently completed LPN.     Orders:    amphetamine-dextroamphetamine (ADDERALL XR, 20MG,) 20 MG 24 hr capsule; Take 1 capsule (20 mg total) by mouth every morning Max Daily Amount: 20 mg

## 2024-12-19 NOTE — PROGRESS NOTES
"Name: Amber Boogie      : 2000      MRN: [id number]  Encounter Provider: Chrissy Armenta MD  Encounter Date: 2024   Encounter department: [facility]  :  Assessment & Plan  Attention deficit hyperactivity disorder (ADHD), combined type  Continue adderall xr 20 mg. Tolerating medication well.  Continue with 3m follow ups for controlled substance.  Controlled substance agreement up-to-date.  Recently completed LPN.     Orders:    amphetamine-dextroamphetamine (ADDERALL XR, 20MG,) 20 MG 24 hr capsule; Take 1 capsule (20 mg total) by mouth every morning Max Daily Amount: 20 mg           History of Present Illness     Patient presents with:  Follow-up: Medication add    Overall doing well. Tolerating medication. Denies any side effects. Recently completed LPN. Plans on pursuing RN.       Review of Systems   Constitutional:  Negative for activity change, fatigue and fever.   Eyes:  Negative for visual disturbance.   Respiratory:  Negative for shortness of breath.    Cardiovascular:  Negative for chest pain.   Gastrointestinal:  Negative for abdominal pain, constipation, diarrhea and nausea.   Endocrine: Negative for cold intolerance and heat intolerance.   Musculoskeletal:  Negative for back pain.   Skin:  Negative for rash.   Neurological:  Negative for headaches.   Psychiatric/Behavioral:  Negative for confusion.        Objective   /76 (BP Location: Left arm, Patient Position: Sitting, Cuff Size: Standard)   Pulse 98   Temp (!) 97.1 °F (36.2 °C) (Temporal)   Resp 16   Ht 5' 2\" (1.575 m)   Wt 54.2 kg (119 lb 8 oz)   SpO2 99%   BMI 21.86 kg/m²      Physical Exam  Vitals and nursing note reviewed.   Constitutional:       Appearance: Normal appearance. She is well-developed.   HENT:      Head: Normocephalic and atraumatic.   Cardiovascular:      Rate and Rhythm: Normal rate and regular rhythm.   Pulmonary:      Effort: Pulmonary effort is normal.      Breath sounds: Normal " breath sounds.   Abdominal:      General: Bowel sounds are normal.      Palpations: Abdomen is soft.   Musculoskeletal:      Cervical back: Normal range of motion.   Skin:     General: Skin is warm.   Neurological:      General: No focal deficit present.      Mental Status: She is alert.   Psychiatric:         Mood and Affect: Mood normal.         Speech: Speech normal.

## 2025-01-21 DIAGNOSIS — F90.2 ATTENTION DEFICIT HYPERACTIVITY DISORDER (ADHD), COMBINED TYPE: ICD-10-CM

## 2025-01-22 RX ORDER — DEXTROAMPHETAMINE SACCHARATE, AMPHETAMINE ASPARTATE MONOHYDRATE, DEXTROAMPHETAMINE SULFATE AND AMPHETAMINE SULFATE 5; 5; 5; 5 MG/1; MG/1; MG/1; MG/1
20 CAPSULE, EXTENDED RELEASE ORAL EVERY MORNING
Qty: 30 CAPSULE | Refills: 0 | Status: SHIPPED | OUTPATIENT
Start: 2025-01-22 | End: 2025-02-21

## 2025-02-19 ENCOUNTER — OFFICE VISIT (OUTPATIENT)
Dept: FAMILY MEDICINE CLINIC | Facility: CLINIC | Age: 25
End: 2025-02-19
Payer: COMMERCIAL

## 2025-02-19 VITALS
RESPIRATION RATE: 18 BRPM | OXYGEN SATURATION: 100 % | TEMPERATURE: 98 F | HEIGHT: 62 IN | HEART RATE: 99 BPM | SYSTOLIC BLOOD PRESSURE: 112 MMHG | DIASTOLIC BLOOD PRESSURE: 76 MMHG | WEIGHT: 115 LBS | BODY MASS INDEX: 21.16 KG/M2

## 2025-02-19 DIAGNOSIS — F90.2 ATTENTION DEFICIT HYPERACTIVITY DISORDER (ADHD), COMBINED TYPE: Primary | ICD-10-CM

## 2025-02-19 PROCEDURE — 99213 OFFICE O/P EST LOW 20 MIN: CPT | Performed by: FAMILY MEDICINE

## 2025-02-19 RX ORDER — DEXTROAMPHETAMINE SACCHARATE, AMPHETAMINE ASPARTATE MONOHYDRATE, DEXTROAMPHETAMINE SULFATE AND AMPHETAMINE SULFATE 5; 5; 5; 5 MG/1; MG/1; MG/1; MG/1
20 CAPSULE, EXTENDED RELEASE ORAL EVERY MORNING
Qty: 30 CAPSULE | Refills: 0 | Status: SHIPPED | OUTPATIENT
Start: 2025-02-19 | End: 2025-03-21

## 2025-02-19 NOTE — PROGRESS NOTES
"Name: Amber Boogie      : 2000      MRN: [id number]  Encounter Provider: Chrissy Armenta MD  Encounter Date: 2025   Encounter department: [facility]  :  Assessment & Plan  Attention deficit hyperactivity disorder (ADHD), combined type  Continue adderall xr 20 mg. Tolerating medication well.  Continue with 3m follow ups for controlled substance.  Controlled substance agreement up-to-date.  Recently completed LPN.     Orders:    amphetamine-dextroamphetamine (ADDERALL XR, 20MG,) 20 MG 24 hr capsule; Take 1 capsule (20 mg total) by mouth every morning Max Daily Amount: 20 mg             History of Present Illness   Patient presents with:  med check    Tolerating medication. Recently completed LPN.   No side effects       Review of Systems   Constitutional:  Negative for activity change, fatigue and fever.   Eyes:  Negative for visual disturbance.   Respiratory:  Negative for shortness of breath.    Cardiovascular:  Negative for chest pain.   Gastrointestinal:  Negative for abdominal pain, constipation, diarrhea and nausea.   Endocrine: Negative for cold intolerance and heat intolerance.   Musculoskeletal:  Negative for back pain.   Skin:  Negative for rash.   Neurological:  Negative for headaches.   Psychiatric/Behavioral:  Negative for confusion.        Objective   /76 (BP Location: Left arm, Patient Position: Sitting, Cuff Size: Standard)   Pulse 99   Temp 98 °F (36.7 °C) (Temporal)   Resp 18   Ht 5' 2\" (1.575 m)   Wt 52.2 kg (115 lb)   SpO2 100%   BMI 21.03 kg/m²      Physical Exam  Vitals and nursing note reviewed.   Constitutional:       Appearance: Normal appearance. She is well-developed.   HENT:      Head: Normocephalic and atraumatic.   Cardiovascular:      Rate and Rhythm: Normal rate and regular rhythm.   Pulmonary:      Effort: Pulmonary effort is normal.      Breath sounds: Normal breath sounds.   Abdominal:      General: Bowel sounds are normal.      " Palpations: Abdomen is soft.   Musculoskeletal:      Cervical back: Normal range of motion.   Skin:     General: Skin is warm.   Neurological:      General: No focal deficit present.      Mental Status: She is alert.   Psychiatric:         Mood and Affect: Mood normal.         Speech: Speech normal.

## 2025-02-24 ENCOUNTER — TELEPHONE (OUTPATIENT)
Age: 25
End: 2025-02-24

## 2025-02-24 DIAGNOSIS — F90.2 ATTENTION DEFICIT HYPERACTIVITY DISORDER (ADHD), COMBINED TYPE: ICD-10-CM

## 2025-02-24 RX ORDER — DEXTROAMPHETAMINE SACCHARATE, AMPHETAMINE ASPARTATE MONOHYDRATE, DEXTROAMPHETAMINE SULFATE AND AMPHETAMINE SULFATE 5; 5; 5; 5 MG/1; MG/1; MG/1; MG/1
20 CAPSULE, EXTENDED RELEASE ORAL EVERY MORNING
Qty: 30 CAPSULE | Refills: 0 | Status: SHIPPED | OUTPATIENT
Start: 2025-02-24 | End: 2025-03-26

## 2025-02-24 NOTE — TELEPHONE ENCOUNTER
Patient called in stated due to her (ADDERALL XR, 20MG,)  being sent twice to different pharmacies she is unable to get now as her insurance will not pay for it, since it's been re*-prescribed twice. Patient would like a call is very unhappy.

## 2025-02-27 NOTE — TELEPHONE ENCOUNTER
Lm for patient to contact the office, was contacting patient to see whether or not she was able to get Adderall medication.

## 2025-03-10 ENCOUNTER — APPOINTMENT (OUTPATIENT)
Dept: MRI IMAGING | Facility: HOSPITAL | Age: 25
End: 2025-03-10
Payer: COMMERCIAL

## 2025-03-10 ENCOUNTER — APPOINTMENT (EMERGENCY)
Dept: CT IMAGING | Facility: HOSPITAL | Age: 25
End: 2025-03-10
Payer: COMMERCIAL

## 2025-03-10 ENCOUNTER — HOSPITAL ENCOUNTER (OUTPATIENT)
Facility: HOSPITAL | Age: 25
Setting detail: OBSERVATION
Discharge: HOME | End: 2025-03-10
Attending: EMERGENCY MEDICINE | Admitting: STUDENT IN AN ORGANIZED HEALTH CARE EDUCATION/TRAINING PROGRAM
Payer: COMMERCIAL

## 2025-03-10 VITALS
SYSTOLIC BLOOD PRESSURE: 115 MMHG | DIASTOLIC BLOOD PRESSURE: 74 MMHG | WEIGHT: 114 LBS | HEIGHT: 62 IN | OXYGEN SATURATION: 100 % | RESPIRATION RATE: 19 BRPM | HEART RATE: 94 BPM | BODY MASS INDEX: 20.98 KG/M2 | TEMPERATURE: 97.4 F

## 2025-03-10 DIAGNOSIS — R55 SYNCOPE: Primary | ICD-10-CM

## 2025-03-10 LAB
ALBUMIN SERPL BCG-MCNC: 4.3 G/DL (ref 3.5–5)
ALP SERPL-CCNC: 45 U/L (ref 34–104)
ALT SERPL W P-5'-P-CCNC: 19 U/L (ref 7–52)
ANION GAP SERPL CALCULATED.3IONS-SCNC: 7 MMOL/L (ref 4–13)
AST SERPL W P-5'-P-CCNC: 16 U/L (ref 13–39)
ATRIAL RATE: 110 BPM
BASOPHILS # BLD AUTO: 0.05 THOUSANDS/ÂΜL (ref 0–0.1)
BASOPHILS NFR BLD AUTO: 1 % (ref 0–1)
BILIRUB SERPL-MCNC: 0.37 MG/DL (ref 0.2–1)
BUN SERPL-MCNC: 14 MG/DL (ref 5–25)
CALCIUM SERPL-MCNC: 9.8 MG/DL (ref 8.4–10.2)
CHLORIDE SERPL-SCNC: 103 MMOL/L (ref 96–108)
CO2 SERPL-SCNC: 26 MMOL/L (ref 21–32)
CREAT SERPL-MCNC: 0.8 MG/DL (ref 0.6–1.3)
EOSINOPHIL # BLD AUTO: 0.02 THOUSAND/ÂΜL (ref 0–0.61)
EOSINOPHIL NFR BLD AUTO: 0 % (ref 0–6)
ERYTHROCYTE [DISTWIDTH] IN BLOOD BY AUTOMATED COUNT: 12.5 % (ref 11.6–15.1)
EXT PREGNANCY TEST URINE: NEGATIVE
EXT. CONTROL: NORMAL
FOLATE SERPL-MCNC: 8.7 NG/ML
GFR SERPL CREATININE-BSD FRML MDRD: 104 ML/MIN/1.73SQ M
GLUCOSE SERPL-MCNC: 102 MG/DL (ref 65–140)
HCT VFR BLD AUTO: 41.3 % (ref 34.8–46.1)
HGB BLD-MCNC: 14.1 G/DL (ref 11.5–15.4)
IMM GRANULOCYTES # BLD AUTO: 0.03 THOUSAND/UL (ref 0–0.2)
IMM GRANULOCYTES NFR BLD AUTO: 0 % (ref 0–2)
LYMPHOCYTES # BLD AUTO: 1.77 THOUSANDS/ÂΜL (ref 0.6–4.47)
LYMPHOCYTES NFR BLD AUTO: 22 % (ref 14–44)
MAGNESIUM SERPL-MCNC: 2.1 MG/DL (ref 1.9–2.7)
MCH RBC QN AUTO: 27.4 PG (ref 26.8–34.3)
MCHC RBC AUTO-ENTMCNC: 34.1 G/DL (ref 31.4–37.4)
MCV RBC AUTO: 80 FL (ref 82–98)
MONOCYTES # BLD AUTO: 0.53 THOUSAND/ÂΜL (ref 0.17–1.22)
MONOCYTES NFR BLD AUTO: 7 % (ref 4–12)
NEUTROPHILS # BLD AUTO: 5.62 THOUSANDS/ÂΜL (ref 1.85–7.62)
NEUTS SEG NFR BLD AUTO: 70 % (ref 43–75)
NRBC BLD AUTO-RTO: 0 /100 WBCS
P AXIS: 83 DEGREES
PLATELET # BLD AUTO: 270 THOUSANDS/UL (ref 149–390)
PMV BLD AUTO: 10.1 FL (ref 8.9–12.7)
POTASSIUM SERPL-SCNC: 3.8 MMOL/L (ref 3.5–5.3)
PR INTERVAL: 136 MS
PROLACTIN SERPL-MCNC: 21.47 NG/ML (ref 3.34–26.72)
PROT SERPL-MCNC: 7.3 G/DL (ref 6.4–8.4)
QRS AXIS: 77 DEGREES
QRSD INTERVAL: 84 MS
QT INTERVAL: 334 MS
QTC INTERVAL: 452 MS
RBC # BLD AUTO: 5.15 MILLION/UL (ref 3.81–5.12)
SODIUM SERPL-SCNC: 136 MMOL/L (ref 135–147)
T WAVE AXIS: 13 DEGREES
TSH SERPL DL<=0.05 MIU/L-ACNC: 3.36 UIU/ML (ref 0.45–4.5)
VENTRICULAR RATE: 110 BPM
VIT B12 SERPL-MCNC: 281 PG/ML (ref 180–914)
WBC # BLD AUTO: 8.02 THOUSAND/UL (ref 4.31–10.16)

## 2025-03-10 PROCEDURE — 70450 CT HEAD/BRAIN W/O DYE: CPT

## 2025-03-10 PROCEDURE — 82746 ASSAY OF FOLIC ACID SERUM: CPT | Performed by: PHYSICIAN ASSISTANT

## 2025-03-10 PROCEDURE — 70553 MRI BRAIN STEM W/O & W/DYE: CPT

## 2025-03-10 PROCEDURE — 99205 OFFICE O/P NEW HI 60 MIN: CPT | Performed by: PSYCHIATRY & NEUROLOGY

## 2025-03-10 PROCEDURE — 81025 URINE PREGNANCY TEST: CPT | Performed by: EMERGENCY MEDICINE

## 2025-03-10 PROCEDURE — A9585 GADOBUTROL INJECTION: HCPCS | Performed by: STUDENT IN AN ORGANIZED HEALTH CARE EDUCATION/TRAINING PROGRAM

## 2025-03-10 PROCEDURE — 36415 COLL VENOUS BLD VENIPUNCTURE: CPT | Performed by: EMERGENCY MEDICINE

## 2025-03-10 PROCEDURE — 82607 VITAMIN B-12: CPT | Performed by: PHYSICIAN ASSISTANT

## 2025-03-10 PROCEDURE — 93010 ELECTROCARDIOGRAM REPORT: CPT | Performed by: INTERNAL MEDICINE

## 2025-03-10 PROCEDURE — 99223 1ST HOSP IP/OBS HIGH 75: CPT | Performed by: STUDENT IN AN ORGANIZED HEALTH CARE EDUCATION/TRAINING PROGRAM

## 2025-03-10 PROCEDURE — 96361 HYDRATE IV INFUSION ADD-ON: CPT

## 2025-03-10 PROCEDURE — 84443 ASSAY THYROID STIM HORMONE: CPT | Performed by: EMERGENCY MEDICINE

## 2025-03-10 PROCEDURE — 85025 COMPLETE CBC W/AUTO DIFF WBC: CPT | Performed by: EMERGENCY MEDICINE

## 2025-03-10 PROCEDURE — 83735 ASSAY OF MAGNESIUM: CPT | Performed by: EMERGENCY MEDICINE

## 2025-03-10 PROCEDURE — 99285 EMERGENCY DEPT VISIT HI MDM: CPT

## 2025-03-10 PROCEDURE — 96365 THER/PROPH/DIAG IV INF INIT: CPT

## 2025-03-10 PROCEDURE — 80053 COMPREHEN METABOLIC PANEL: CPT | Performed by: EMERGENCY MEDICINE

## 2025-03-10 PROCEDURE — 84146 ASSAY OF PROLACTIN: CPT | Performed by: EMERGENCY MEDICINE

## 2025-03-10 PROCEDURE — 99285 EMERGENCY DEPT VISIT HI MDM: CPT | Performed by: EMERGENCY MEDICINE

## 2025-03-10 PROCEDURE — RECHECK: Performed by: NURSE PRACTITIONER

## 2025-03-10 PROCEDURE — 93005 ELECTROCARDIOGRAM TRACING: CPT

## 2025-03-10 RX ORDER — NORELGESTROMIN AND ETHINYL ESTRADIOL 35; 150 UG/MG; UG/MG
1 PATCH TRANSDERMAL WEEKLY
Status: DISCONTINUED | OUTPATIENT
Start: 2025-03-10 | End: 2025-03-11 | Stop reason: HOSPADM

## 2025-03-10 RX ORDER — GADOBUTROL 604.72 MG/ML
5 INJECTION INTRAVENOUS
Status: COMPLETED | OUTPATIENT
Start: 2025-03-10 | End: 2025-03-10

## 2025-03-10 RX ORDER — ACETAMINOPHEN 10 MG/ML
1000 INJECTION, SOLUTION INTRAVENOUS ONCE
Status: COMPLETED | OUTPATIENT
Start: 2025-03-10 | End: 2025-03-10

## 2025-03-10 RX ORDER — DEXTROAMPHETAMINE SACCHARATE, AMPHETAMINE ASPARTATE MONOHYDRATE, DEXTROAMPHETAMINE SULFATE AND AMPHETAMINE SULFATE 5; 5; 5; 5 MG/1; MG/1; MG/1; MG/1
20 CAPSULE, EXTENDED RELEASE ORAL EVERY MORNING
Status: DISCONTINUED | OUTPATIENT
Start: 2025-03-11 | End: 2025-03-11 | Stop reason: HOSPADM

## 2025-03-10 RX ORDER — ALBUTEROL SULFATE 90 UG/1
2 INHALANT RESPIRATORY (INHALATION) EVERY 4 HOURS PRN
Status: DISCONTINUED | OUTPATIENT
Start: 2025-03-10 | End: 2025-03-11 | Stop reason: HOSPADM

## 2025-03-10 RX ADMIN — GADOBUTROL 5 ML: 604.72 INJECTION INTRAVENOUS at 19:28

## 2025-03-10 RX ADMIN — SODIUM CHLORIDE 1000 ML: 0.9 INJECTION, SOLUTION INTRAVENOUS at 10:15

## 2025-03-10 RX ADMIN — ACETAMINOPHEN 1000 MG: 10 INJECTION INTRAVENOUS at 10:15

## 2025-03-10 NOTE — LETTER
[facility]  [address]  [place] [address]  No information on file.    March 10, 2025     Patient: Amber Boogie   YOB: 2000   Date of Visit: 3/10/2025       To Whom it May Concern:    Amber Boogie is under my professional care. She was seen in the hospital from 3/10/2025 to 03/10/25. She may return to work on 3/12/25 without limitations.    If you have any questions or concerns, please don't hesitate to call.         Sincerely,          NUBIA Hylton

## 2025-03-10 NOTE — DISCHARGE SUMMARY
Discharge Summary - Hospitalist   Name: Amber Boogie 24 y.o. female I MRN: [id number]  Unit/Bed#: -01 I Date of Admission: 3/10/2025   Date of Service: 3/10/2025 I Hospital Day: 0     Assessment & Plan  Syncope  Sudden episode of syncope with premonitory symptom of left visual change and episode of nausea and vomiting x 1 afterward.  Vital signs essentially normal aside from sinus tachycardia  No clear inciting cause for this event.  S/p IV fluids  Head CT unremarkable  TSH, B12, folate normal.  Orthostatics normal  Low suspicion for cardiac event.  Consider echo and Zio patch as op  MRI brain was unremarkable  Per neurology, can follow up as outpatient   ADHD (attention deficit hyperactivity disorder)  Adderall     Medical Problems       Resolved Problems  Date Reviewed: 3/10/2025   None       Discharging Physician / Practitioner: NUBIA Hylton  PCP: Chrissy Armenta MD  Admission Date:   Admission Orders (From admission, onward)       Ordered        03/10/25 1237  Place in Observation  Once                          Discharge Date: 03/10/25    Consultations During Hospital Stay:  Neurology    Procedures Performed:   None    Significant Findings / Test Results:   N/a    Incidental Findings:   N/a       Test Results Pending at Discharge (will require follow up):   none     Outpatient Tests Requested:  EEG and neurology follow up    Complications:  none    Reason for Admission: Syncope     Hospital Course:   Amber Boogie is a 24 y.o. female patient who originally presented to the hospital on 3/10/2025 due to syncopal episode at work.  Patient was admitted and evaluated by neurology.  CT head was unremarkable.  Neurology recommends against starting antiepileptic drugs as patient is not presenting with typical epileptic symptoms.  Neurology recommended admitting patient and obtaining MRI brain along with routine EEG.  Patient did not want to stay overnight in the hospital thus neurology was contacted  who recommended obtaining MRI brain and if normal with no further syncopal episodes she is okay for discharge from neurology standpoint.  MRI brain was unremarkable.    Patient to be discharged with outpatient neurology follow-up..      Please see above list of diagnoses and related plan for additional information.     Condition at Discharge: stable    Discharge Day Visit / Exam:   * Please refer to separate progress note for these details *    Discussion with Family: Updated  (significant other) at bedside.    Discharge instructions/Information to patient and family:   See after visit summary for information provided to patient and family.      Provisions for Follow-Up Care:  See after visit summary for information related to follow-up care and any pertinent home health orders.      Mobility at time of Discharge:   Basic Mobility Inpatient Raw Score: 24  JH-HLM Goal: 8: Walk 250 feet or more  JH-HLM Achieved: 6: Walk 10 steps or more  HLM Goal achieved. Continue to encourage appropriate mobility.     Disposition:   Home    Planned Readmission: no    Discharge Medications:  See after visit summary for reconciled discharge medications provided to patient and/or family.      Administrative Statements   Discharge Statement:  I have spent a total time of 35 minutes in caring for this patient on the day of the visit/encounter. >30 minutes of time was spent on: Diagnostic results, Prognosis, Risks and benefits of tx options, Instructions for management, and Patient and family education.    **Please Note: This note may have been constructed using a voice recognition system**

## 2025-03-10 NOTE — H&P
"H&P - Hospitalist   Name: Amber Boogie 24 y.o. female I MRN: [id number]  Unit/Bed#: JOAN I Date of Admission: 3/10/2025   Date of Service: 3/10/2025 I Hospital Day: 0     Assessment & Plan  Syncope  Sudden episode of syncope with premonitory symptom of left visual change and episode of nausea and vomiting x 1 afterward.  Vital signs essentially normal aside from sinus tachycardia  No clear inciting cause for this event.  Received IV fluids in ED  Head CT unremarkable  TSH normal.  Check B12 and folate  Check orthostatics  Monitor on telemetry overnight  Low suspicion for cardiac event.  Consider echo and Zio patch depending upon neurology workup  Obtain EEG  Consider MRI brain  ADHD (attention deficit hyperactivity disorder)  Adderall      VTE Pharmacologic Prophylaxis:   OOB  Code Status: Level 1 - Full Code   Discussion with family: Updated  (significant other) at bedside.    Anticipated Length of Stay: Patient will be admitted on an observation basis with an anticipated length of stay of less than 2 midnights secondary to syncopal event of unclear etiology.    History of Present Illness     Chief Complaint: Passed out at work    Amber Boogie is a 24 y.o. female with a PMH of asthma and ADHD who presents with witnessed event of passing out at work.  Patient states it was her first day at work at [employer] and her morning was uneventful.  She woke up, took her Adderall and birth control pill as she normally would, ate and drink normally and was otherwise feeling well.  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  She did not describe this as tunnel vision.  The next thing she knew she awoke in a chair, and had an event of nausea and vomiting.  She had mild discomfort on the left side of her head.  She did not feel her fellow coworkers were doing a good " enough job assessing her so she took her own blood sugar and vital signs which were overall normal.  After about 1 hour she felt back to normal.  She reports that her coworker said she had lost consciousness for about 5 minutes.  She did not feel dizzy or lightheaded before passing out and has never had an event like this before.  She has not taken any other new medications or used any illegal substances.     Review of Systems   Constitutional:  Negative for activity change, appetite change, chills, diaphoresis, fatigue, fever and unexpected weight change.   HENT:  Negative for congestion and trouble swallowing.    Eyes:  Positive for visual disturbance. Negative for photophobia and pain.   Respiratory:  Negative for apnea, cough, choking, chest tightness, shortness of breath, wheezing and stridor.    Cardiovascular:  Negative for chest pain, palpitations and leg swelling.   Gastrointestinal:  Positive for nausea and vomiting. Negative for abdominal distention, abdominal pain, anal bleeding, blood in stool, constipation and diarrhea.   Genitourinary:  Negative for decreased urine volume, difficulty urinating and dysuria.   Musculoskeletal:  Negative for arthralgias, back pain, gait problem, joint swelling, myalgias, neck pain and neck stiffness.   Skin:  Negative for color change, pallor, rash and wound.   Neurological:  Positive for syncope and headaches (Mild left headache pain patient following syncope). Negative for dizziness, tremors, seizures, facial asymmetry, speech difficulty, weakness, light-headedness and numbness.        Denies history of migraines   Psychiatric/Behavioral:  Negative for behavioral problems and confusion.        Historical Information   Past Medical History:   Diagnosis Date    Anxiety     Asthma     Chlamydia     Depression     Miscarriage     Scoliosis     Trichotillomania     Urogenital trichomoniasis      Past Surgical History:   Procedure Laterality Date    DILATION AND CURETTAGE OF  UTERUS      NO PAST SURGERIES      THERAPEUTIC       Non Surgery- Pill Form     Social History     Tobacco Use    Smoking status: Never    Smokeless tobacco: Never   Vaping Use    Vaping status: Never Used   Substance and Sexual Activity    Alcohol use: Never    Drug use: Never    Sexual activity: Yes     Partners: Female, Male     Birth control/protection: Patch     E-Cigarette/Vaping    E-Cigarette Use Never User      E-Cigarette/Vaping Substances    Nicotine No     THC No     CBD No     Flavoring No     Other No     Unknown No      Family history of seizures in her cousin  Social History:  Marital Status: Single   Occupation: Nurse  Patient Pre-hospital Living Situation: Home  Patient Pre-hospital Level of Mobility: walks  Patient Pre-hospital Diet Restrictions: none    Meds/Allergies   I have reviewed home medications with patient personally.  Prior to Admission medications    Medication Sig Start Date End Date Taking? Authorizing Provider   albuterol (Ventolin HFA) 90 mcg/act inhaler Inhale 2 puffs every 4 (four) hours as needed for wheezing 11/15/24   Chrissy Armenta MD   amphetamine-dextroamphetamine (ADDERALL XR, 20MG,) 20 MG 24 hr capsule Take 1 capsule (20 mg total) by mouth every morning Max Daily Amount: 20 mg 2/24/25 3/26/25  Chrissy Armenta MD   norelgestromin-ethinyl estradiol (Zafemy) 150-35 MCG/24HR One patch weekly for 3 weeks and then off for 1 week. 3-month supply mail order. 24   Virginia Willard PA-C     Allergies   Allergen Reactions    Fruit & Vegetable Daily [Fish Oil - Food Allergy] GI Intolerance     Grape fruit    Molds & Smuts Allergic Rhinitis    Pollen Extract Allergic Rhinitis    Lorazepam Rash       Objective :  Temp:  [97.8 °F (36.6 °C)] 97.8 °F (36.6 °C)  HR:  [108-117] 117  BP: (124-140)/(79-82) 124/82  Resp:  [18] 18  SpO2:  [98 %-100 %] 98 %  O2 Device: None (Room air)    Physical Exam  Vitals reviewed.   Constitutional:       General: She is not in  acute distress.     Appearance: Normal appearance. She is not ill-appearing, toxic-appearing or diaphoretic.   HENT:      Mouth/Throat:      Mouth: Mucous membranes are moist.      Pharynx: Oropharynx is clear. No oropharyngeal exudate or posterior oropharyngeal erythema.      Comments: Tongue midline  Eyes:      General: No scleral icterus.        Right eye: No discharge.         Left eye: No discharge.      Extraocular Movements: Extraocular movements intact.      Conjunctiva/sclera: Conjunctivae normal.      Pupils: Pupils are equal, round, and reactive to light.   Cardiovascular:      Rate and Rhythm: Regular rhythm. Tachycardia present.      Heart sounds: No murmur heard.     Comments: Mild sinus tachycardia in the low 100 range  Pulmonary:      Effort: No respiratory distress.      Breath sounds: No stridor. No wheezing, rhonchi or rales.   Abdominal:      General: There is no distension.      Palpations: Abdomen is soft.      Tenderness: There is no guarding.   Musculoskeletal:         General: No swelling, tenderness, deformity or signs of injury.      Right lower leg: No edema.      Left lower leg: No edema.   Skin:     General: Skin is warm and dry.      Coloration: Skin is not jaundiced or pale.      Findings: No bruising, erythema, lesion or rash.   Neurological:      General: No focal deficit present.      Mental Status: She is alert. Mental status is at baseline.      Comments: Awake alert interactive.  No dysarthria.  No aphasia.  Strength full in bilateral upper and lower extremities with limitation that patient would not bend her arm with the IV in it.  No facial asymmetry.  Tongue midline.  No tremor.          Lines/Drains:            Lab Results: I have reviewed the following results:  Results from last 7 days   Lab Units 03/10/25  1014   WBC Thousand/uL 8.02   HEMOGLOBIN g/dL 14.1   HEMATOCRIT % 41.3   PLATELETS Thousands/uL 270   SEGS PCT % 70   LYMPHO PCT % 22   MONO PCT % 7   EOS PCT % 0  "    Results from last 7 days   Lab Units 03/10/25  1014   SODIUM mmol/L 136   POTASSIUM mmol/L 3.8   CHLORIDE mmol/L 103   CO2 mmol/L 26   BUN mg/dL 14   CREATININE mg/dL 0.80   ANION GAP mmol/L 7   CALCIUM mg/dL 9.8   ALBUMIN g/dL 4.3   TOTAL BILIRUBIN mg/dL 0.37   ALK PHOS U/L 45   ALT U/L 19   AST U/L 16   GLUCOSE RANDOM mg/dL 102             No results found for: \"HGBA1C\"      Ct head    Administrative Statements       ** Please Note: This note has been constructed using a voice recognition system. **    "

## 2025-03-10 NOTE — ED PROVIDER NOTES
Time reflects when diagnosis was documented in both MDM as applicable and the Disposition within this note       Time User Action Codes Description Comment    3/10/2025 12:36 PM gabby Craig Yoel BARTOLO Stovall [[id number]] Syncope           ED Disposition       ED Disposition   Admit    Condition   Stable    Date/Time   Mon Mar 10, 2025 12:36 PM    Comment   Case was discussed with Dr. Cordero and the patient's admission status was agreed to be Admission Status: observation status to the service of Dr. Cordero.               Assessment & Plan       Medical Decision Making  24-year-old female presents after syncopal episode at work.  Patient had witnessed syncopal episode with prolonged downtime approximately 5 minutes with secondary visual change that resolved as well as emesis.  Differential diagnosis includes but is not limited to vasovagal syncope, cardiac syncope, arrhythmia, seizure.  Laboratory analysis showed no acute pertinent findings.  EKG as interpreted by myself as below.  CT showed no acute intracranial abnormality.  Due to patient's concerning history discussed with internal medicine service who accepted the patient for further evaluation and management.    Amount and/or Complexity of Data Reviewed  Labs: ordered.  Radiology: ordered.    Risk  Prescription drug management.  Decision regarding hospitalization.             Medications   albuterol (PROVENTIL HFA,VENTOLIN HFA) inhaler 2 puff (has no administration in time range)   amphetamine-dextroamphetamine (ADDERALL XR) 20 MG 24 hr capsule 20 mg (has no administration in time range)   norelgestromin-ethinyl estradiol (ORTHO EVRA) 150-35 MCG/24HR 1 patch (has no administration in time range)   sodium chloride 0.9 % bolus 1,000 mL (1,000 mL Intravenous New Bag 3/10/25 1015)   acetaminophen (Ofirmev) injection 1,000 mg (0 mg Intravenous Stopped 3/10/25 1119)       ED Risk Strat Scores                                                History of Present Illness        Chief Complaint   Patient presents with    Syncope     Pt states had a witnessed syncopal episode prior to arrival states had a +loc ~5 minutes. -headstrike   States felt dizzy, blurry left sided vision and had a aura prior to falling        Past Medical History:   Diagnosis Date    Anxiety     Asthma     Chlamydia     Depression     Miscarriage     Scoliosis     Trichotillomania     Urogenital trichomoniasis       Past Surgical History:   Procedure Laterality Date    DILATION AND CURETTAGE OF UTERUS      NO PAST SURGERIES      THERAPEUTIC       Non Surgery- Pill Form      Family History   Problem Relation Age of Onset    Mental illness Mother     Endometriosis Mother     Diabetes Father     Mental illness Father     No Known Problems Sister     Hypertension Maternal Grandmother     Endometriosis Maternal Grandmother     Alcohol abuse Maternal Grandfather     Mental illness Paternal Grandmother     Mental illness Paternal Grandfather     Stomach cancer Paternal Grandfather       Social History     Tobacco Use    Smoking status: Never    Smokeless tobacco: Never   Vaping Use    Vaping status: Never Used   Substance Use Topics    Alcohol use: Never    Drug use: Never      E-Cigarette/Vaping    E-Cigarette Use Never User       E-Cigarette/Vaping Substances    Nicotine No     THC No     CBD No     Flavoring No     Other No     Unknown No       I have reviewed and agree with the history as documented.     (Amber Boogie) Amber Boogie is a 24 y.o. female     They presented to the emergency department on March 10, 2025. Patient presents with:  Syncope: Pt states had a witnessed syncopal episode prior to arrival states had a +loc ~5 minutes. -headstrike States felt dizzy, blurry left sided vision and had a aura prior to falling.    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   Patient denies any head strike, but notes when she awoke she was unable to see out of her left eye and had vomiting.  Patient states that her vision has returned to normal, and is not feeling nauseous at this time.  Patient states that she does have a slight headache.  Patient notes that she only takes birth control as well as Adderall.  Patient denies fever, chills, chest pain, difficulty breathing, numbness, tingling, weakness, change in bowel habits, change in urination, or any other complaint at this time.              Review of Systems   Constitutional:  Negative for chills and fever.   HENT:  Negative for ear pain and sore throat.    Eyes:  Positive for visual disturbance. Negative for pain.   Respiratory:  Negative for cough and shortness of breath.    Cardiovascular:  Negative for chest pain and palpitations.   Gastrointestinal:  Positive for nausea and vomiting. Negative for abdominal pain.   Genitourinary:  Negative for dysuria and hematuria.   Musculoskeletal:  Negative for arthralgias and back pain.   Skin:  Negative for color change and rash.   Neurological:  Positive for syncope. Negative for seizures.   All other systems reviewed and are negative.          Objective       ED Triage Vitals   Temperature Pulse Blood Pressure Respirations SpO2 Patient Position - Orthostatic VS   03/10/25 0935 03/10/25 0934 03/10/25 0934 03/10/25 0934 03/10/25 0934 03/10/25 1232   97.8 °F (36.6 °C) (!) 117 140/79 18 100 % Sitting      Temp Source Heart Rate Source BP Location FiO2 (%) Pain Score    03/10/25 1500 03/10/25 1232 03/10/25 1232 -- 03/10/25 0934    Oral Monitor Right arm  3      Vitals      Date and Time Temp Pulse SpO2 Resp BP Pain Score FACES Pain Rating User   03/10/25 1515 -- -- 98 % 19 -- -- -- MJ   03/10/25 1500 98.1 °F (36.7 °C) 100 98 % 18 113/71 -- -- MJ   03/10/25 1232 -- 117 98 % 18 124/82 -- -- AF   03/10/25 0945 -- 108 100 % -- 140/79 -- -- AF   03/10/25 0935 97.8 °F (36.6 °C) -- -- -- -- -- -- AP    03/10/25 0934 -- 117 100 % 18 140/79 3 -- AP            Physical Exam  Vitals and nursing note reviewed.   Constitutional:       General: She is not in acute distress.     Appearance: Normal appearance.   HENT:      Head: Normocephalic and atraumatic.      Right Ear: External ear normal.      Left Ear: External ear normal.      Nose: Nose normal.      Mouth/Throat:      Mouth: Mucous membranes are moist.   Eyes:      Conjunctiva/sclera: Conjunctivae normal.   Cardiovascular:      Rate and Rhythm: Normal rate and regular rhythm.   Pulmonary:      Effort: Pulmonary effort is normal. No respiratory distress.      Breath sounds: Normal breath sounds.   Abdominal:      General: Abdomen is flat. Bowel sounds are normal.      Tenderness: There is no abdominal tenderness. There is no guarding or rebound.   Musculoskeletal:         General: Normal range of motion.      Cervical back: Normal range of motion.   Skin:     General: Skin is warm and dry.      Capillary Refill: Capillary refill takes less than 2 seconds.   Neurological:      General: No focal deficit present.      Mental Status: She is alert and oriented to person, place, and time. Mental status is at baseline.      Cranial Nerves: No cranial nerve deficit.      Sensory: No sensory deficit.      Motor: No weakness.      Coordination: Coordination normal.   Psychiatric:         Mood and Affect: Mood normal.         Results Reviewed       Procedure Component Value Units Date/Time    Vitamin B12 [[id number]]     Lab Status: No result Specimen: Blood     Folate [[id number]]     Lab Status: No result Specimen: Blood     TSH [[id number]]  (Normal) Collected: 03/10/25 1014    Lab Status: Final result Specimen: Blood from Arm, Left Updated: 03/10/25 1233     TSH 3RD GENERATON 3.358 uIU/mL     POCT pregnancy, urine [[id number]]  (Normal) Collected: 03/10/25 1131    Lab Status: Final result Updated: 03/10/25 1131     EXT Preg Test, Ur Negative     Control Valid     Comprehensive metabolic panel [[id number]] Collected: 03/10/25 1014    Lab Status: Final result Specimen: Blood from Arm, Left Updated: 03/10/25 1129     Sodium 136 mmol/L      Potassium 3.8 mmol/L      Chloride 103 mmol/L      CO2 26 mmol/L      ANION GAP 7 mmol/L      BUN 14 mg/dL      Creatinine 0.80 mg/dL      Glucose 102 mg/dL      Calcium 9.8 mg/dL      AST 16 U/L      ALT 19 U/L      Alkaline Phosphatase 45 U/L      Total Protein 7.3 g/dL      Albumin 4.3 g/dL      Total Bilirubin 0.37 mg/dL      eGFR 104 ml/min/1.73sq m     Narrative:      [employer] Kid[employer] guidelines for Chronic Kidney Disease (CKD):     Stage 1 with normal or high GFR (GFR > 90 mL/min/1.73 square meters)    Stage 2 Mild CKD (GFR = 60-89 mL/min/1.73 square meters)    Stage 3A Moderate CKD (GFR = 45-59 mL/min/1.73 square meters)    Stage 3B Moderate CKD (GFR = 30-44 mL/min/1.73 square meters)    Stage 4 Severe CKD (GFR = 15-29 mL/min/1.73 square meters)    Stage 5 End Stage CKD (GFR <15 mL/min/1.73 square meters)  Note: GFR calculation is accurate only with a steady state creatinine    Magnesium [[id number]]  (Normal) Collected: 03/10/25 1014    Lab Status: Final result Specimen: Blood from Arm, Left Updated: 03/10/25 1129     Magnesium 2.1 mg/dL     CBC and differential [[id number]]  (Abnormal) Collected: 03/10/25 1014    Lab Status: Final result Specimen: Blood from Arm, Left Updated: 03/10/25 1032     WBC 8.02 Thousand/uL      RBC 5.15 Million/uL      Hemoglobin 14.1 g/dL      Hematocrit 41.3 %      MCV 80 fL      MCH 27.4 pg      MCHC 34.1 g/dL      RDW 12.5 %      MPV 10.1 fL      Platelets 270 Thousands/uL      nRBC 0 /100 WBCs      Segmented % 70 %      Immature Grans % 0 %      Lymphocytes % 22 %      Monocytes % 7 %      Eosinophils Relative 0 %      Basophils Relative 1 %      Absolute Neutrophils 5.62 Thousands/µL      Absolute Immature Grans 0.03 Thousand/uL      Absolute Lymphocytes 1.77 Thousands/µL      Absolute  Monocytes 0.53 Thousand/µL      Eosinophils Absolute 0.02 Thousand/µL      Basophils Absolute 0.05 Thousands/µL     Prolactin [[id number]] Collected: 03/10/25 1014    Lab Status: In process Specimen: Blood from Arm, Left Updated: 03/10/25 1022            CT head without contrast   Final Interpretation by Simon Pimentel MD (03/10 1045)      No acute intracranial abnormality.                  Resident: Isael Belle I, the attending radiologist, have reviewed the images and agree with the final report above.      Workstation performed: [id number]             ECG 12 Lead Documentation Only    Date/Time: 3/10/2025 10:03 AM    Performed by: Yoel Mccray MD  Authorized by: Yoel Mccray MD    ECG reviewed by me, the ED Provider: yes    Patient location:  ED  Previous ECG:     Previous ECG:  Compared to current    Similarity:  No change  Interpretation:     Interpretation: normal    Rate:     ECG rate:  110    ECG rate assessment: tachycardic    Rhythm:     Rhythm: sinus rhythm and sinus tachycardia    Ectopy:     Ectopy: none    QRS:     QRS axis:  Normal    QRS intervals:  Normal  Conduction:     Conduction: normal    ST segments:     ST segments:  Normal  T waves:     T waves: normal    Comments:      Sinus tachycardia 110 bpm, no PAC, no PVC, no acute ischemic changes.      ED Medication and Procedure Management   Prior to Admission Medications   Prescriptions Last Dose Informant Patient Reported? Taking?   albuterol (Ventolin HFA) 90 mcg/act inhaler  Self No No   Sig: Inhale 2 puffs every 4 (four) hours as needed for wheezing   amphetamine-dextroamphetamine (ADDERALL XR, 20MG,) 20 MG 24 hr capsule   No No   Sig: Take 1 capsule (20 mg total) by mouth every morning Max Daily Amount: 20 mg   norelgestromin-ethinyl estradiol (Zafemy) 150-35 MCG/24HR  Self No No   Sig: One patch weekly for 3 weeks and then off for 1 week. 3-month supply mail order.      Facility-Administered Medications: None      Current Discharge Medication List        CONTINUE these medications which have NOT CHANGED    Details   albuterol (Ventolin HFA) 90 mcg/act inhaler Inhale 2 puffs every 4 (four) hours as needed for wheezing  Qty: 8 g, Refills: 0    Comments: Substitution to a formulary equivalent within the same pharmaceutical class is authorized.  Associated Diagnoses: Cough      amphetamine-dextroamphetamine (ADDERALL XR, 20MG,) 20 MG 24 hr capsule Take 1 capsule (20 mg total) by mouth every morning Max Daily Amount: 20 mg  Qty: 30 capsule, Refills: 0    Associated Diagnoses: Attention deficit hyperactivity disorder (ADHD), combined type      norelgestromin-ethinyl estradiol (Zafemy) 150-35 MCG/24HR One patch weekly for 3 weeks and then off for 1 week. 3-month supply mail order.  Qty: 9 patch, Refills: 3    Associated Diagnoses: Encounter for surveillance of transdermal patch hormonal contraceptive device           No discharge procedures on file.  ED SEPSIS DOCUMENTATION   Time reflects when diagnosis was documented in both MDM as applicable and the Disposition within this note       Time User Action Codes Description Comment    3/10/2025 12:36 PM Yoel Mccray Add [R55] Syncope                  Yoel Mccray MD  03/10/25 1145

## 2025-03-10 NOTE — ASSESSMENT & PLAN NOTE
Adderall   Patient is concerned because she has 3 days left of Adderall and her Mail order pharmacy won't be sending it out until Saturday, and she won't receive it in time, please call and advise.

## 2025-03-10 NOTE — PROGRESS NOTES
"Patient came back from MRI and called to have her Iv removed. Patient educated on the importance of having IV access while in the hospital. Patient stated she \"felt like I don't need this and I have the right to refuse if I want\". Dr. Mayelin Holm made aware and Iv was removed.   "

## 2025-03-10 NOTE — ASSESSMENT & PLAN NOTE
Sudden episode of syncope with premonitory symptom of left visual change and episode of nausea and vomiting x 1 afterward.  Vital signs essentially normal aside from sinus tachycardia  No clear inciting cause for this event.  S/p IV fluids  Head CT unremarkable  TSH, B12, folate normal.  Orthostatics normal  Low suspicion for cardiac event.  Consider echo and Zio patch as op  MRI brain was unremarkable  Per neurology, can follow up as outpatient

## 2025-03-10 NOTE — CONSULTS
Consultation - Neurology   Name: Amber Boogie 24 y.o. female I MRN: [id number]  Unit/Bed#: -01 I Date of Admission: 3/10/2025   Date of Service: 3/10/2025 I Hospital Day: 0   Inpatient consult to Neurology  Consult performed by: Melody Phipps PA-C  Consult ordered by: Ashli Wade PA-C      Physician Requesting Evaluation: Mayelin Holm DO   Reason for Evaluation / Principal Problem: Syncope    Assessment & Plan  Syncope  Amber Boogie is a 24 y.o. female with ADHD who presents to send ED on 3/10/2025 following a syncopal event at work with LOC x 5 minutes.    Syncope and LOC x 5 minutes preceded by tunnel vision, with no witnessed seizure-like activity urinary/fecal incontinence, tongue bite, or postictal confusion.  Following the syncopal event, patient reporting left peripheral field deficits which slowly improved over an hour and resolved on its own.     Workup:  - CT head: Unremarkable for acute intracranial abnormalities  - Labs WNL: CBC, CMP, magnesium, TSH, urine pregnancy negative    Etiology remains unclear at this time. Low suspicion for seizure as there was no witnessed seizure-like activity or postictal state.  However given reported left peripheral field deficits following this syncopal event, will obtain MRI brain to rule out acute intracranial abnormalities.  Consider cardiogenic etiology.     Plan:  - Routine EEG pending  - MRI brain w wo seizure protocol pending  - Consider extended cardiac monitoring on discharge via Ziopatch   - Pending: Prolactin, Vitamin B12, folate  - Would not recommend initiating AEDs at this time  - Telemetry  - Orthostatics pending   - Discussed seizure precautions:  No driving, lifting heavy machinery, climbing heights, swimming unattended, hot tub baths or any other activity that will place you in danger in case of a seizure for at least six months.  - [employer] form completed and submitted online on 3/10/2025  - Medical management supportive care per primary  team, notify with changes    Neurology follow-up:  Amber Boogie will need follow-up in  2 to 4 weeks  with epilepsy team for Other in 60 minute appointment. They will not require outpatient neurological testing.     History of Present Illness   Amber Boogie is a 24 y.o.  female with ADHD who presents to [facility] ED on 3/10/2025 following a syncopal event at work with LOC x 5 minutes.    Patient reports that she woke up today in her normal state of health, stating that she had good oral intake today before going into work.  Patient states that she slept well last night, more than usual.  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  Patient reports that she does not believe that the syncopal event was witnessed by her coworkers, however somebody reported that the patient did not hit her head.  No witnessed seizure-like activity reported per discussion with patient.  Patient denies any urinary/fecal incontinence or tongue bite.  Patient states that she was unconscious for approximately 5 minutes and upon regaining consciousness she noticed a left peripheral visual field deficit.  Patient states that she was not confused as to where she was or who was around her.  She was able to take her own vitals, reporting slightly elevated systolic BP in the 150s, heart rate in the 120s, and glucose in the 110s.    Patient denies any similar syncopal events in the past.  She reports that she has had genetic testing completed in the past which showed that she had a genetic predisposition to have seizures.  She does report that she has a cousin who has epilepsy.  Denies any recent head trauma, prior CNS infection, seizures in childhood, new medications, recent medication changes, tobacco abuse, alcohol abuse, illicit drug use.    Patient presented to [place] ED on 3/10/2025, BP on presentation  140/79. HR elevated 117.  CT head unremarkable for acute intracranial abnormalities.  Patient states that the left peripheral visual field deficits went on for approximately 1 hour, slowly getting better over time, and eventually resolved within 10 minutes of being in the ED.  She states that she noted right facial pain when she got to the ED which resolved after getting IV Tylenol.  Denies chest pain, shortness of breath, abdominal pain, nausea, vomiting, headache, double vision, blurry vision, loss of vision, weakness, numbness/tingling.    Review of Systems   12 point ROS performed, as stated above, all others negative.  Historical Information   Past Medical History:   Diagnosis Date    Anxiety     Asthma     Chlamydia     Depression     Miscarriage     Scoliosis     Trichotillomania     Urogenital trichomoniasis      Past Surgical History:   Procedure Laterality Date    DILATION AND CURETTAGE OF UTERUS      NO PAST SURGERIES      THERAPEUTIC       Non Surgery- Pill Form     Social History     Tobacco Use    Smoking status: Never    Smokeless tobacco: Never   Vaping Use    Vaping status: Never Used   Substance and Sexual Activity    Alcohol use: Never    Drug use: Never    Sexual activity: Yes     Partners: Female, Male     Birth control/protection: Patch     E-Cigarette/Vaping    E-Cigarette Use Never User      E-Cigarette/Vaping Substances    Nicotine No     THC No     CBD No     Flavoring No     Other No     Unknown No      Family History   Problem Relation Age of Onset    Mental illness Mother     Endometriosis Mother     Diabetes Father     Mental illness Father     No Known Problems Sister     Hypertension Maternal Grandmother     Endometriosis Maternal Grandmother     Alcohol abuse Maternal Grandfather     Mental illness Paternal Grandmother     Mental illness Paternal Grandfather     Stomach cancer Paternal Grandfather      Social History     Tobacco Use    Smoking status: Never    Smokeless  tobacco: Never   Vaping Use    Vaping status: Never Used   Substance and Sexual Activity    Alcohol use: Never    Drug use: Never    Sexual activity: Yes     Partners: Female, Male     Birth control/protection: Patch       Current Facility-Administered Medications:     albuterol (PROVENTIL HFA,VENTOLIN HFA) inhaler 2 puff, Q4H PRN    [START ON 3/11/2025] amphetamine-dextroamphetamine (ADDERALL XR) 20 MG 24 hr capsule 20 mg, QAM    norelgestromin-ethinyl estradiol (ORTHO EVRA) 150-35 MCG/24HR 1 patch, Weekly  Prior to Admission Medications   Prescriptions Last Dose Informant Patient Reported? Taking?   albuterol (Ventolin HFA) 90 mcg/act inhaler  Self No No   Sig: Inhale 2 puffs every 4 (four) hours as needed for wheezing   amphetamine-dextroamphetamine (ADDERALL XR, 20MG,) 20 MG 24 hr capsule   No No   Sig: Take 1 capsule (20 mg total) by mouth every morning Max Daily Amount: 20 mg   norelgestromin-ethinyl estradiol (Zafemy) 150-35 MCG/24HR  Self No No   Sig: One patch weekly for 3 weeks and then off for 1 week. 3-month supply mail order.      Facility-Administered Medications: None     Fruit & vegetable daily [fish oil - food allergy], Molds & smuts, Pollen extract, and Lorazepam    Objective :  Temp:  [97.8 °F (36.6 °C)-98.1 °F (36.7 °C)] 98.1 °F (36.7 °C)  HR:  [100-117] 100  BP: (113-140)/(71-82) 113/71  Resp:  [18-19] 19  SpO2:  [98 %-100 %] 98 %  O2 Device: None (Room air)    Physical Exam  Vitals and nursing note reviewed.   Constitutional:       General: She is not in acute distress.     Appearance: Normal appearance. She is not ill-appearing, toxic-appearing or diaphoretic.   HENT:      Head: Normocephalic and atraumatic.   Eyes:      General: No scleral icterus.        Right eye: No discharge.         Left eye: No discharge.      Extraocular Movements: Extraocular movements intact.      Conjunctiva/sclera: Conjunctivae normal.      Pupils: Pupils are equal, round, and reactive to light.   Musculoskeletal:          General: Normal range of motion.   Skin:     General: Skin is warm and dry.      Coloration: Skin is not jaundiced or pale.   Neurological:      Mental Status: She is alert.      Motor: Motor strength is normal.  Psychiatric:         Mood and Affect: Mood normal.         Behavior: Behavior normal.         Thought Content: Thought content normal.         Judgment: Judgment normal.       Neurological Exam  Mental Status  Alert.  Patient is alert, sitting up in bed, accompanied by boyfriend  Oriented to person, place, month, and year   Able to name all objects provided   Follows central and appendicular commands   Answers all questions appropriately   No dysarthria or aphasia noted .    Cranial Nerves  CN III, IV, VI: Extraocular movements intact bilaterally. Pupils equal round and reactive to light bilaterally.  Primary gaze midline, conjugate gaze noted   No gaze preference or forced gaze deviation   Pupils dilated, equal, round, reactive bilaterally   No visual field deficits noted   EOMs intacts, no evidence of nystagmus   Facial sensation intact to light touch and pinprick bilaterally  No facial asymmetry noted   Hearing intact   Tongue midline, no deviation or lacerations .    Motor  Normal muscle bulk throughout. Normal muscle tone. Strength is 5/5 throughout all four extremities.    Sensory  Light touch is normal in upper and lower extremities. Pinprick is normal in upper and lower extremities.   No evidence of extinction with bilateral simultaneous stimulation.    Reflexes  Bilateral upper reflexes 1+ throughout  Bilateral patellar reflexes 2+    No involuntary movements or rhythmic seizure-like activity noted throughout exam.    Coordination    No ataxia or dysmetria in bilateral upper extremity finger-nose testing or bilateral lower extremity heel-to-shin testing.    Gait  Casual gait is normal including stance, stride, and arm swing.      Lab Results: I have personally reviewed pertinent  reports.  Recent Results (from the past 24 hours)   ECG 12 lead    Collection Time: 03/10/25  9:38 AM   Result Value Ref Range    Ventricular Rate 110 BPM    Atrial Rate 110 BPM    CA Interval 136 ms    QRSD Interval 84 ms    QT Interval 334 ms    QTC Interval 452 ms    P Axis 83 degrees    QRS Axis 77 degrees    T Wave Axis 13 degrees   CBC and differential    Collection Time: 03/10/25 10:14 AM   Result Value Ref Range    WBC 8.02 4.31 - 10.16 Thousand/uL    RBC 5.15 (H) 3.81 - 5.12 Million/uL    Hemoglobin 14.1 11.5 - 15.4 g/dL    Hematocrit 41.3 34.8 - 46.1 %    MCV 80 (L) 82 - 98 fL    MCH 27.4 26.8 - 34.3 pg    MCHC 34.1 31.4 - 37.4 g/dL    RDW 12.5 11.6 - 15.1 %    MPV 10.1 8.9 - 12.7 fL    Platelets 270 149 - 390 Thousands/uL    nRBC 0 /100 WBCs    Segmented % 70 43 - 75 %    Immature Grans % 0 0 - 2 %    Lymphocytes % 22 14 - 44 %    Monocytes % 7 4 - 12 %    Eosinophils Relative 0 0 - 6 %    Basophils Relative 1 0 - 1 %    Absolute Neutrophils 5.62 1.85 - 7.62 Thousands/µL    Absolute Immature Grans 0.03 0.00 - 0.20 Thousand/uL    Absolute Lymphocytes 1.77 0.60 - 4.47 Thousands/µL    Absolute Monocytes 0.53 0.17 - 1.22 Thousand/µL    Eosinophils Absolute 0.02 0.00 - 0.61 Thousand/µL    Basophils Absolute 0.05 0.00 - 0.10 Thousands/µL   Comprehensive metabolic panel    Collection Time: 03/10/25 10:14 AM   Result Value Ref Range    Sodium 136 135 - 147 mmol/L    Potassium 3.8 3.5 - 5.3 mmol/L    Chloride 103 96 - 108 mmol/L    CO2 26 21 - 32 mmol/L    ANION GAP 7 4 - 13 mmol/L    BUN 14 5 - 25 mg/dL    Creatinine 0.80 0.60 - 1.30 mg/dL    Glucose 102 65 - 140 mg/dL    Calcium 9.8 8.4 - 10.2 mg/dL    AST 16 13 - 39 U/L    ALT 19 7 - 52 U/L    Alkaline Phosphatase 45 34 - 104 U/L    Total Protein 7.3 6.4 - 8.4 g/dL    Albumin 4.3 3.5 - 5.0 g/dL    Total Bilirubin 0.37 0.20 - 1.00 mg/dL    eGFR 104 ml/min/1.73sq m   Magnesium    Collection Time: 03/10/25 10:14 AM   Result Value Ref Range    Magnesium 2.1 1.9 -  2.7 mg/dL   Prolactin    Collection Time: 03/10/25 10:14 AM   Result Value Ref Range    Prolactin 21.47 3.34 - 26.72 ng/mL   TSH    Collection Time: 03/10/25 10:14 AM   Result Value Ref Range    TSH 3RD GENERATON 3.358 0.450 - 4.500 uIU/mL   POCT pregnancy, urine    Collection Time: 03/10/25 11:31 AM   Result Value Ref Range    EXT Preg Test, Ur Negative     Control Valid      Imaging Studies: I have personally reviewed pertinent reports and I have personally reviewed pertinent films in PACS.    EKG, Pathology, and Other Studies: I have personally reviewed pertinent reports.    VTE Prophylaxis: Patient in the ED, will be placed on DVT prophylaxis if admitted to the floor    Dictation voice to text software has been used in the creation of this document.  Please consider this in light of any contextual or grammatical errors.

## 2025-03-10 NOTE — ASSESSMENT & PLAN NOTE
Amber Boogie is a 24 y.o. female with ADHD who presents to send ED on 3/10/2025 following a syncopal event at work with LOC x 5 minutes.    Syncope and LOC x 5 minutes preceded by tunnel vision, with no witnessed seizure-like activity urinary/fecal incontinence, tongue bite, or postictal confusion.  Following the syncopal event, patient reporting left peripheral field deficits which slowly improved over an hour and resolved on its own.     Workup:  - CT head: Unremarkable for acute intracranial abnormalities  - Labs WNL: CBC, CMP, magnesium, TSH, urine pregnancy negative    Etiology remains unclear at this time. Low suspicion for seizure as there was no witnessed seizure-like activity or postictal state.  However given reported left peripheral field deficits following this syncopal event, will obtain MRI brain to rule out acute intracranial abnormalities.  Consider cardiogenic etiology.     Plan:  - Routine EEG pending  - MRI brain w wo seizure protocol pending  - Consider extended cardiac monitoring on discharge via Ziopatch   - Pending: Prolactin, Vitamin B12, folate  - Would not recommend initiating AEDs at this time  - Telemetry  - Orthostatics pending   - Discussed seizure precautions:  No driving, lifting heavy machinery, climbing heights, swimming unattended, hot tub baths or any other activity that will place you in danger in case of a seizure for at least six months.  - [employer] form completed and submitted online on 3/10/2025  - Medical management supportive care per primary team, notify with changes

## 2025-03-10 NOTE — ASSESSMENT & PLAN NOTE
Sudden episode of syncope with premonitory symptom of left visual change and episode of nausea and vomiting x 1 afterward.  Vital signs essentially normal aside from sinus tachycardia  No clear inciting cause for this event.  Received IV fluids in ED  Head CT unremarkable  TSH normal.  Check B12 and folate  Check orthostatics  Monitor on telemetry overnight  Low suspicion for cardiac event.  Consider echo and Zio patch depending upon neurology workup  Obtain EEG  Consider MRI brain

## 2025-03-11 ENCOUNTER — TELEPHONE (OUTPATIENT)
Dept: FAMILY MEDICINE CLINIC | Facility: CLINIC | Age: 25
End: 2025-03-11

## 2025-03-11 ENCOUNTER — TRANSITIONAL CARE MANAGEMENT (OUTPATIENT)
Dept: FAMILY MEDICINE CLINIC | Facility: CLINIC | Age: 25
End: 2025-03-11

## 2025-03-11 ENCOUNTER — OFFICE VISIT (OUTPATIENT)
Dept: FAMILY MEDICINE CLINIC | Facility: CLINIC | Age: 25
End: 2025-03-11
Payer: COMMERCIAL

## 2025-03-11 VITALS
HEIGHT: 62 IN | DIASTOLIC BLOOD PRESSURE: 70 MMHG | BODY MASS INDEX: 21.07 KG/M2 | RESPIRATION RATE: 16 BRPM | OXYGEN SATURATION: 99 % | WEIGHT: 114.5 LBS | TEMPERATURE: 97.9 F | HEART RATE: 91 BPM | SYSTOLIC BLOOD PRESSURE: 116 MMHG

## 2025-03-11 DIAGNOSIS — F90.2 ATTENTION DEFICIT HYPERACTIVITY DISORDER (ADHD), COMBINED TYPE: ICD-10-CM

## 2025-03-11 DIAGNOSIS — F41.9 ANXIETY: ICD-10-CM

## 2025-03-11 DIAGNOSIS — R55 SYNCOPE, UNSPECIFIED SYNCOPE TYPE: Primary | ICD-10-CM

## 2025-03-11 DIAGNOSIS — Z84.89 FAMILY HISTORY OF SEIZURES: ICD-10-CM

## 2025-03-11 PROCEDURE — 99496 TRANSJ CARE MGMT HIGH F2F 7D: CPT | Performed by: FAMILY MEDICINE

## 2025-03-11 NOTE — TELEPHONE ENCOUNTER
Patient called on 3/11/25 and scheduled TCM on 3/11/25. Patient was discharged on 3/10/25. Questions need to be completed.

## 2025-03-11 NOTE — NURSING NOTE
Discharge paperwork reviewed and sent with patient. IV and Tele removed. No meds sent to pharmacy. Note for employer given to patient prior to leaving. Patient walked out with boyfriend to lobby for discharge.

## 2025-03-11 NOTE — PROGRESS NOTES
Transition of Care Visit  Name: Amber Boogie      : 2000      MRN: [id number]  Encounter Provider: Chrissy Armenta MD  Encounter Date: 3/11/2025   Encounter department: [facility]    Assessment & Plan  Syncope, unspecified syncope type  Status post admission for possible seizure versus syncopal episode.  MRI of the brain completed unremarkable.  Will place referral to outpatient neurology for EEG.  Will also obtain echocardiogram.  Asymptomatic at this time.  Orders:    Ambulatory Referral to Neurology; Future    Echo complete w/ contrast if indicated; Future    Anxiety         Attention deficit hyperactivity disorder (ADHD), combined type         Family history of seizures    Orders:    Ambulatory Referral to Neurology; Future     TCM visit completed today  Reviewed hospital course and imaging today with patient  MRI of the brain unremarkable.  EEG was not completed  With episode of syncope consider echocardiogram and Holter monitor      History of Present Illness     Transitional Care Management Review:   Amber Boogie is a 24 y.o. female here for TCM follow up.     During the TCM phone call patient stated:  TCM Call (since 2025)       Date and time call was made  3/11/2025 11:25 AM    Hospital care reviewed  Records reviewed    Patient was hospitialized at  [facility]    Date of Admission  03/10/25    Date of discharge  03/10/25    Diagnosis  syncope    Disposition  Home    Were the patients medications reviewed and updated  Yes    Current Symptoms  None          TCM Call (since 2025)       Post hospital issues  None    Scheduled for follow up?  Yes    Did you obtain your prescribed medications  Yes    Do you need help managing your prescriptions or medications  No    Is transportation to your appointment needed  No    I have advised the patient to call PCP with any new or worsening symptoms  Jan norman MA    Living Arrangements  Alone    Support  "System  None    Are you recieving home care services  No          Evaluated by neurology.  Per neurology note no indication to start AED as this was her first seizure.  They recommend obtaining routine EEG and MRI of the brain with and without contrast on epilepsy protocol.  MRI of the brain was unremarkable.  EEG was not completed  Cleared for discharge by neurology  Echo was not completed either.  Patient did not want stay overnight hospital.  Neurology would like patient to leave once MRI of the brain was unremarkable.            Review of Systems   Constitutional:  Negative for activity change, fatigue and fever.   Eyes:  Negative for photophobia, pain and visual disturbance.   Respiratory:  Negative for shortness of breath.    Cardiovascular:  Negative for chest pain.   Gastrointestinal:  Negative for abdominal pain, constipation, diarrhea and nausea.   Endocrine: Negative for cold intolerance and heat intolerance.   Musculoskeletal:  Negative for back pain.   Skin:  Negative for rash.   Neurological:  Negative for seizures, speech difficulty, weakness, light-headedness, numbness and headaches.   Psychiatric/Behavioral:  Negative for confusion and dysphoric mood.      Objective   /70 (BP Location: Right arm, Patient Position: Sitting, Cuff Size: Standard)   Pulse 91   Temp 97.9 °F (36.6 °C) (Temporal)   Resp 16   Ht 5' 2\" (1.575 m)   Wt 51.9 kg (114 lb 8 oz)   LMP 02/28/2025 (Exact Date)   SpO2 99%   BMI 20.94 kg/m²     Physical Exam  Vitals and nursing note reviewed.   Constitutional:       Appearance: Normal appearance. She is well-developed.   HENT:      Head: Normocephalic and atraumatic.   Cardiovascular:      Rate and Rhythm: Normal rate and regular rhythm.   Pulmonary:      Effort: Pulmonary effort is normal.      Breath sounds: Normal breath sounds.   Abdominal:      General: Bowel sounds are normal.      Palpations: Abdomen is soft.   Musculoskeletal:      Cervical back: Normal range of " motion.   Skin:     General: Skin is warm.   Neurological:      General: No focal deficit present.      Mental Status: She is alert.   Psychiatric:         Mood and Affect: Mood normal.         Speech: Speech normal.       Medications have been reviewed by provider in current encounter

## 2025-03-11 NOTE — ASSESSMENT & PLAN NOTE
Status post admission for possible seizure versus syncopal episode.  MRI of the brain completed unremarkable.  Will place referral to outpatient neurology for EEG.  Will also obtain echocardiogram.  Asymptomatic at this time.  Orders:    Ambulatory Referral to Neurology; Future    Echo complete w/ contrast if indicated; Future

## 2025-03-11 NOTE — PLAN OF CARE
Problem: SAFETY ADULT  Goal: Patient will remain free of falls  Description: INTERVENTIONS:  - Educate patient/family on patient safety including physical limitations  - Instruct patient to call for assistance with activity   - Consult OT/PT to assist with strengthening/mobility   - Keep Call bell within reach  - Keep bed low and locked with side rails adjusted as appropriate  - Keep care items and personal belongings within reach  - Initiate and maintain comfort rounds  - Make Fall Risk Sign visible to staff  - Offer Toileting every 2 Hours, in advance of need  - Obtain necessary fall risk management equipment: call bell in reach  - Apply yellow socks and bracelet for high fall risk patients  - Consider moving patient to room near nurses station  Outcome: Progressing  Goal: Maintain or return to baseline ADL function  Description: INTERVENTIONS:  -  Assess patient's ability to carry out ADLs; assess patient's baseline for ADL function and identify physical deficits which impact ability to perform ADLs (bathing, care of mouth/teeth, toileting, grooming, dressing, etc.)  - Assess/evaluate cause of self-care deficits   - Assess range of motion  - Assess patient's mobility; develop plan if impaired  - Assess patient's need for assistive devices and provide as appropriate  - Encourage maximum independence but intervene and supervise when necessary  - Involve family in performance of ADLs  - Assess for home care needs following discharge   - Consider OT consult to assist with ADL evaluation and planning for discharge  - Provide patient education as appropriate  Outcome: Progressing  Goal: Maintains/Returns to pre admission functional level  Description: INTERVENTIONS:  - Perform AM-PAC 6 Click Basic Mobility/ Daily Activity assessment daily.  - Set and communicate daily mobility goal to care team and patient/family/caregiver.   - Collaborate with rehabilitation services on mobility goals if consulted  - Perform Range  of Motion 3 times a day.  - Reposition patient every 2 hours.  - Dangle patient 3 times a day  - Stand patient 3 times a day  - Ambulate patient 3 times a day  - Out of bed to chair 3 times a day   - Out of bed for meals 3 times a day  - Out of bed for toileting  - Record patient progress and toleration of activity level   Outcome: Progressing     Problem: DISCHARGE PLANNING  Goal: Discharge to home or other facility with appropriate resources  Description: INTERVENTIONS:  - Identify barriers to discharge w/patient and caregiver  - Arrange for needed discharge resources and transportation as appropriate  - Identify discharge learning needs (meds, wound care, etc.)  - Arrange for interpretive services to assist at discharge as needed  - Refer to Case Management Department for coordinating discharge planning if the patient needs post-hospital services based on physician/advanced practitioner order or complex needs related to functional status, cognitive ability, or social support system  Outcome: Progressing     Problem: Knowledge Deficit  Goal: Patient/family/caregiver demonstrates understanding of disease process, treatment plan, medications, and discharge instructions  Description: Complete learning assessment and assess knowledge base.  Interventions:  - Provide teaching at level of understanding  - Provide teaching via preferred learning methods  Outcome: Progressing     Problem: NEUROSENSORY - ADULT  Goal: Achieves stable or improved neurological status  Description: INTERVENTIONS  - Monitor and report changes in neurological status  - Monitor vital signs such as temperature, blood pressure, glucose, and any other labs ordered   - Initiate measures to prevent increased intracranial pressure  - Monitor for seizure activity and implement precautions if appropriate      Outcome: Progressing  Goal: Remains free of injury related to seizures activity  Description: INTERVENTIONS  - Maintain airway, patient safety  and  administer oxygen as ordered  - Monitor patient for seizure activity, document and report duration and description of seizure to physician/advanced practitioner  - If seizure occurs,  ensure patient safety during seizure  - Reorient patient post seizure  - Seizure pads on all 4 side rails  - Instruct patient/family to notify RN of any seizure activity including if an aura is experienced  - Instruct patient/family to call for assistance with activity based on nursing assessment  - Administer anti-seizure medications if ordered    Outcome: Progressing     Problem: CARDIOVASCULAR - ADULT  Goal: Maintains optimal cardiac output and hemodynamic stability  Description: INTERVENTIONS:  - Monitor I/O, vital signs and rhythm  - Monitor for S/S and trends of decreased cardiac output  - Administer and titrate ordered vasoactive medications to optimize hemodynamic stability  - Assess quality of pulses, skin color and temperature  - Assess for signs of decreased coronary artery perfusion  - Instruct patient to report change in severity of symptoms  Outcome: Progressing  Goal: Absence of cardiac dysrhythmias or at baseline rhythm  Description: INTERVENTIONS:  - Continuous cardiac monitoring, vital signs, obtain 12 lead EKG if ordered  - Administer antiarrhythmic and heart rate control medications as ordered  - Monitor electrolytes and administer replacement therapy as ordered  Outcome: Progressing

## 2025-03-26 ENCOUNTER — TELEPHONE (OUTPATIENT)
Age: 25
End: 2025-03-26

## 2025-03-26 ENCOUNTER — OFFICE VISIT (OUTPATIENT)
Dept: FAMILY MEDICINE CLINIC | Facility: CLINIC | Age: 25
End: 2025-03-26
Payer: COMMERCIAL

## 2025-03-26 VITALS
DIASTOLIC BLOOD PRESSURE: 70 MMHG | HEART RATE: 108 BPM | WEIGHT: 115.5 LBS | RESPIRATION RATE: 16 BRPM | OXYGEN SATURATION: 99 % | BODY MASS INDEX: 21.25 KG/M2 | TEMPERATURE: 98.3 F | SYSTOLIC BLOOD PRESSURE: 128 MMHG | HEIGHT: 62 IN

## 2025-03-26 DIAGNOSIS — R55 BRIEF LOSS OF CONSCIOUSNESS: ICD-10-CM

## 2025-03-26 DIAGNOSIS — R55 SYNCOPE, UNSPECIFIED SYNCOPE TYPE: Primary | ICD-10-CM

## 2025-03-26 PROCEDURE — 99214 OFFICE O/P EST MOD 30 MIN: CPT | Performed by: FAMILY MEDICINE

## 2025-03-26 NOTE — TELEPHONE ENCOUNTER
03/26/25    Spoke to My Supervisor Miss. Mejía.    She assisted in Matter and Offered 04/02/25, 12:30 PM HFU Appt with Dr. Hammond at the [place] Location.      CALLED Patient.  No Answer.   Left Message of appt.      If patient contact office, please related that 04/02/25 is on hold for her and please confirmed if she wants to keep it.    Please relate message to Miss. Mejía.    Thank You.      HFU / XIOMARA OLMOS / SYNCOPE / BC INDEPENDENCE    Neurology follow-up:  Amber RAFFY Mcclainy will need follow-up in  2 to 4 weeks  with epilepsy team for Other in 60 minute appointment. They will not require outpatient neurological testing.       REFERRAL [id number] CLOSED.      Any questions,please review Referral.  Thank You.

## 2025-03-26 NOTE — PROGRESS NOTES
"Name: Amber Boogie      : 2000      MRN: [id number]  Encounter Provider: Chrissy Armenta MD  Encounter Date: 3/26/2025   Encounter department: [facility]  :  Assessment & Plan  Syncope, unspecified syncope type  Syncopal episode.  Unlikely to be epilepsy however never had an EEG.  MRI of the brain was completed.  She has an echo pending.  Patient needs to be cleared by neurology for [employer] form.  I am going to place an ASAP referral to neurology for further evaluation and clearance.  Patient understands and agrees with the plan.  Discussed this with her and her boyfriend today in office.  Orders:    Ambulatory Referral to Neurology; Future    Brief loss of consciousness    Orders:    Ambulatory Referral to Neurology; Future           History of Present Illness   Patient presents with:  form: Forms for driving     Patient presents today to have forms completed for driving.  She was admitted to the hospital for suspected epilepsy.  She was at diagnosed with syncopal episode.  Form was submitted to [employer] by neurology.  Per neurology's note \"The [employer] will decide whether patient's 's license will be held or not.  If to, often will hold for 6 months until reevaluated by outpatient provider\" she had an MRI while in the hospital which was unremarkable.  Neurology agreed to discharge without EEG.      Review of Systems   Constitutional:  Negative for activity change, fatigue and fever.   Eyes:  Negative for visual disturbance.   Respiratory:  Negative for shortness of breath.    Cardiovascular:  Negative for chest pain.   Gastrointestinal:  Negative for abdominal pain, constipation, diarrhea and nausea.   Endocrine: Negative for cold intolerance and heat intolerance.   Musculoskeletal:  Negative for back pain.   Skin:  Negative for rash.   Neurological:  Negative for headaches.   Psychiatric/Behavioral:  Negative for confusion.        Objective   /70 (BP Location: Left arm, " "Patient Position: Sitting, Cuff Size: Standard)   Pulse (!) 108   Temp 98.3 °F (36.8 °C) (Temporal)   Resp 16   Ht 5' 2\" (1.575 m)   Wt 52.4 kg (115 lb 8 oz)   LMP 02/28/2025 (Exact Date)   SpO2 99%   BMI 21.13 kg/m²      Physical Exam  Vitals and nursing note reviewed.   Constitutional:       Appearance: Normal appearance. She is well-developed.   HENT:      Head: Normocephalic and atraumatic.   Cardiovascular:      Rate and Rhythm: Normal rate and regular rhythm.   Pulmonary:      Effort: Pulmonary effort is normal.      Breath sounds: Normal breath sounds.   Abdominal:      General: Bowel sounds are normal.      Palpations: Abdomen is soft.   Musculoskeletal:      Cervical back: Normal range of motion.   Skin:     General: Skin is warm.   Neurological:      General: No focal deficit present.      Mental Status: She is alert.   Psychiatric:         Mood and Affect: Mood normal.         Speech: Speech normal.       Administrative Statements   I have spent a total time of 30 minutes in caring for this patient on the day of the visit/encounter including Prognosis, Risks and benefits of tx options, Instructions for management, Impressions, Counseling / Coordination of care, Documenting in the medical record, Reviewing/placing orders in the medical record (including tests, medications, and/or procedures), and Obtaining or reviewing history  .  "

## 2025-03-26 NOTE — ASSESSMENT & PLAN NOTE
Syncopal episode.  Unlikely to be epilepsy however never had an EEG.  MRI of the brain was completed.  She has an echo pending.  Patient needs to be cleared by neurology for [employer] form.  I am going to place an ASAP referral to neurology for further evaluation and clearance.  Patient understands and agrees with the plan.  Discussed this with her and her boyfriend today in office.  Orders:    Ambulatory Referral to Neurology; Future

## 2025-03-27 ENCOUNTER — HOSPITAL ENCOUNTER (OUTPATIENT)
Dept: NON INVASIVE DIAGNOSTICS | Facility: HOSPITAL | Age: 25
Discharge: HOME | End: 2025-03-27
Payer: COMMERCIAL

## 2025-03-27 VITALS
HEART RATE: 85 BPM | SYSTOLIC BLOOD PRESSURE: 128 MMHG | BODY MASS INDEX: 21.16 KG/M2 | DIASTOLIC BLOOD PRESSURE: 70 MMHG | WEIGHT: 115 LBS | HEIGHT: 62 IN

## 2025-03-27 DIAGNOSIS — R55 SYNCOPE, UNSPECIFIED SYNCOPE TYPE: ICD-10-CM

## 2025-03-27 LAB
AORTIC ROOT: 2.4 CM
BSA FOR ECHO PROCEDURE: 1.51 M2
E WAVE DECELERATION TIME: 199 MS
E/A RATIO: 1.95
FRACTIONAL SHORTENING: 33 (ref 28–44)
INTERVENTRICULAR SEPTUM IN DIASTOLE (PARASTERNAL SHORT AXIS VIEW): 0.8 CM
INTERVENTRICULAR SEPTUM: 0.8 CM (ref 0.6–1.1)
LAAS-AP2: 15.3 CM2
LAAS-AP4: 15.6 CM2
LEFT ATRIUM SIZE: 2.3 CM
LEFT ATRIUM VOLUME (MOD BIPLANE): 42 ML
LEFT ATRIUM VOLUME INDEX (MOD BIPLANE): 27.8 ML/M2
LEFT INTERNAL DIMENSION IN SYSTOLE: 2.7 CM (ref 2.1–4)
LEFT VENTRICULAR INTERNAL DIMENSION IN DIASTOLE: 4 CM (ref 3.5–6)
LEFT VENTRICULAR POSTERIOR WALL IN END DIASTOLE: 0.8 CM
LEFT VENTRICULAR STROKE VOLUME: 42 ML
LV EF US.2D.A4C+ESTIMATED: 55 %
LVSV (TEICH): 42 ML
MV E'TISSUE VEL-SEP: 14 CM/S
MV PEAK A VEL: 0.43 M/S
MV PEAK E VEL: 84 CM/S
MV STENOSIS PRESSURE HALF TIME: 58 MS
MV VALVE AREA P 1/2 METHOD: 3.79
RA PRESSURE ESTIMATED: 8 MMHG
RIGHT ATRIUM AREA SYSTOLE A4C: 7 CM2
RIGHT VENTRICLE ID DIMENSION: 2 CM
RV PSP: 22 MMHG
SL CV LEFT ATRIUM LENGTH A2C: 4.4 CM
SL CV LV EF: 55
SL CV PED ECHO LEFT VENTRICLE DIASTOLIC VOLUME (MOD BIPLANE) 2D: 70 ML
SL CV PED ECHO LEFT VENTRICLE SYSTOLIC VOLUME (MOD BIPLANE) 2D: 28 ML
TR MAX PG: 14 MMHG
TR PEAK VELOCITY: 1.9 M/S
TRICUSPID ANNULAR PLANE SYSTOLIC EXCURSION: 1.7 CM
TRICUSPID VALVE PEAK REGURGITATION VELOCITY: 1.85 M/S

## 2025-03-27 PROCEDURE — 93306 TTE W/DOPPLER COMPLETE: CPT

## 2025-03-27 PROCEDURE — 93306 TTE W/DOPPLER COMPLETE: CPT | Performed by: INTERNAL MEDICINE

## 2025-03-31 DIAGNOSIS — F90.2 ATTENTION DEFICIT HYPERACTIVITY DISORDER (ADHD), COMBINED TYPE: ICD-10-CM

## 2025-03-31 RX ORDER — DEXTROAMPHETAMINE SACCHARATE, AMPHETAMINE ASPARTATE MONOHYDRATE, DEXTROAMPHETAMINE SULFATE AND AMPHETAMINE SULFATE 5; 5; 5; 5 MG/1; MG/1; MG/1; MG/1
20 CAPSULE, EXTENDED RELEASE ORAL EVERY MORNING
Qty: 30 CAPSULE | Refills: 0 | Status: SHIPPED | OUTPATIENT
Start: 2025-03-31 | End: 2025-04-30

## 2025-04-02 ENCOUNTER — OFFICE VISIT (OUTPATIENT)
Dept: NEUROLOGY | Facility: CLINIC | Age: 25
End: 2025-04-02
Payer: COMMERCIAL

## 2025-04-02 VITALS
SYSTOLIC BLOOD PRESSURE: 102 MMHG | DIASTOLIC BLOOD PRESSURE: 64 MMHG | HEIGHT: 62 IN | BODY MASS INDEX: 21.16 KG/M2 | WEIGHT: 115 LBS | OXYGEN SATURATION: 100 % | HEART RATE: 101 BPM

## 2025-04-02 DIAGNOSIS — R55 SYNCOPE, UNSPECIFIED SYNCOPE TYPE: Primary | ICD-10-CM

## 2025-04-02 PROCEDURE — G2212 PROLONG OUTPT/OFFICE VIS: HCPCS | Performed by: PSYCHIATRY & NEUROLOGY

## 2025-04-02 PROCEDURE — 99215 OFFICE O/P EST HI 40 MIN: CPT | Performed by: PSYCHIATRY & NEUROLOGY
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
## 2025-04-02 NOTE — PATIENT INSTRUCTIONS
-- I will have you get a routine EEG. If this is normal, I may have you get a second EEG.     -- It will be important to continue to stay well hydrated, get regular sleep, and avoid skipping meals. I would also recommend continuing your plan to help assure you are managing stress.

## 2025-04-03 NOTE — ASSESSMENT & PLAN NOTE
Overall based off the description of her event, this appears to be most suggestive of an event of syncope.  She did not have any witnessed seizure-like activity and did not have any postictal state.  She was able to give full instructions to her coworkers as to how to evaluate her event.  She has no seizure risk factors and does not have any prior history of epilepsy.  I did review her MRI with her which did not show any abnormalities that would be a potential source of seizures.    Because her event was likely triggered by combination of stress, poor sleep, and poor hydration, I did recommend that she avoid the situations and work to better manage her stress.  It will be very important for her to drink regular fluids and get regular sleep to avoid any additional episodes.  We did discuss options to help treat stress/anxiety, but she feels she is managing this well and was not interested in pursuing any other treatment.    Overall I feel the likelihood that her event was an epileptic seizure is low, but I would like her to get a routine EEG.  If this is normal, I will likely have her get 1 additional EEG to help increase the diagnostic yield and fully evaluate for the possibility of seizures.    --Because she does not have any evidence of epilepsy, I would not recommend any seizure medications at this point.    --She did have just 1 episode of syncope, and so I will fill out a loss of consciousness form for Nick today.  If cleared by Nick, I would not have any objections to her driving if avoiding triggers for her events  Orders:    EEG awake or drowsy routine; Future

## 2025-05-02 DIAGNOSIS — F90.2 ATTENTION DEFICIT HYPERACTIVITY DISORDER (ADHD), COMBINED TYPE: ICD-10-CM

## 2025-05-04 RX ORDER — DEXTROAMPHETAMINE SACCHARATE, AMPHETAMINE ASPARTATE MONOHYDRATE, DEXTROAMPHETAMINE SULFATE AND AMPHETAMINE SULFATE 5; 5; 5; 5 MG/1; MG/1; MG/1; MG/1
20 CAPSULE, EXTENDED RELEASE ORAL EVERY MORNING
Qty: 30 CAPSULE | Refills: 0 | Status: SHIPPED | OUTPATIENT
Start: 2025-05-04 | End: 2025-06-03

## 2025-05-05 NOTE — PROGRESS NOTES
Annual Exam Pre-charting    Last Annual Exam: 22    Last PAP/HPV Test and Result:     Last Mammo Screening:    Last STD Culture Screenin24    Current BC Method: Zafemy- Patch

## 2025-05-06 ENCOUNTER — ANNUAL EXAM (OUTPATIENT)
Dept: OBGYN CLINIC | Facility: CLINIC | Age: 25
End: 2025-05-06

## 2025-05-06 VITALS
DIASTOLIC BLOOD PRESSURE: 88 MMHG | HEIGHT: 62 IN | BODY MASS INDEX: 21.2 KG/M2 | SYSTOLIC BLOOD PRESSURE: 126 MMHG | WEIGHT: 115.2 LBS

## 2025-05-06 DIAGNOSIS — Z01.419 ENCOUNTER FOR GYNECOLOGICAL EXAMINATION (GENERAL) (ROUTINE) WITHOUT ABNORMAL FINDINGS: Primary | ICD-10-CM

## 2025-05-06 DIAGNOSIS — Z12.4 SCREENING FOR CERVICAL CANCER: ICD-10-CM

## 2025-05-06 DIAGNOSIS — Z30.45 ENCOUNTER FOR SURVEILLANCE OF TRANSDERMAL PATCH HORMONAL CONTRACEPTIVE DEVICE: ICD-10-CM

## 2025-05-06 PROCEDURE — G0145 SCR C/V CYTO,THINLAYER,RESCR: HCPCS | Performed by: PHYSICIAN ASSISTANT

## 2025-05-06 RX ORDER — NORELGESTROMIN AND ETHINYL ESTRADIOL 35; 150 UG/MG; UG/MG
PATCH TRANSDERMAL
Qty: 9 PATCH | Refills: 4 | Status: SHIPPED | OUTPATIENT
Start: 2025-05-06
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
## 2025-05-09 ENCOUNTER — RESULTS FOLLOW-UP (OUTPATIENT)
Dept: OBGYN CLINIC | Facility: CLINIC | Age: 25
End: 2025-05-09

## 2025-05-09 LAB
LAB AP GYN PRIMARY INTERPRETATION: NORMAL
Lab: NORMAL

## 2025-05-10 ENCOUNTER — PATIENT MESSAGE (OUTPATIENT)
Dept: FAMILY MEDICINE CLINIC | Facility: CLINIC | Age: 25
End: 2025-05-10

## 2025-05-12 ENCOUNTER — NURSE TRIAGE (OUTPATIENT)
Age: 25
End: 2025-05-12

## 2025-05-12 ENCOUNTER — OFFICE VISIT (OUTPATIENT)
Dept: FAMILY MEDICINE CLINIC | Facility: CLINIC | Age: 25
End: 2025-05-12
Payer: COMMERCIAL

## 2025-05-12 VITALS
OXYGEN SATURATION: 100 % | HEART RATE: 103 BPM | WEIGHT: 117 LBS | RESPIRATION RATE: 16 BRPM | SYSTOLIC BLOOD PRESSURE: 112 MMHG | DIASTOLIC BLOOD PRESSURE: 62 MMHG | TEMPERATURE: 98.3 F | BODY MASS INDEX: 21.4 KG/M2

## 2025-05-12 DIAGNOSIS — L25.5 RHUS DERMATITIS: Primary | ICD-10-CM

## 2025-05-12 PROCEDURE — 96372 THER/PROPH/DIAG INJ SC/IM: CPT | Performed by: FAMILY MEDICINE

## 2025-05-12 PROCEDURE — 99213 OFFICE O/P EST LOW 20 MIN: CPT | Performed by: FAMILY MEDICINE

## 2025-05-12 RX ORDER — TRIAMCINOLONE ACETONIDE 40 MG/ML
80 INJECTION, SUSPENSION INTRA-ARTICULAR; INTRAMUSCULAR ONCE
Status: COMPLETED | OUTPATIENT
Start: 2025-05-12 | End: 2025-05-12

## 2025-05-12 RX ADMIN — TRIAMCINOLONE ACETONIDE 80 MG: 40 INJECTION, SUSPENSION INTRA-ARTICULAR; INTRAMUSCULAR at 16:57

## 2025-05-12 NOTE — TELEPHONE ENCOUNTER
FOLLOW UP: ASAP    REASON FOR CONVERSATION: Poison Ivy    SYMPTOMS: severe spreading rash, itching.     OTHER: The triage call was disconnected due to technical issue. Triage RN attempted to call back, the call went to voicemail. Message left for the patient to call back. Please call the patient to follow up.     DISPOSITION: Go to [facility]Urgent [facility]

## 2025-05-12 NOTE — TELEPHONE ENCOUNTER
"Reason for Disposition  • Rash involves more than 20% of the body    Answer Assessment - Initial Assessment Questions  1. APPEARANCE of RASH: \"Describe the rash.\"       Red,inflamed, blotchy   2. LOCATION: \"Where is the rash located?\"  (e.g., face, genitals, hands, legs)      Both wrists, trunk   4. ONSET: \"When did the rash begin?\"       4 days ago   5. ITCHING: \"Does the rash itch?\" If Yes, ask: \"How bad is it?\"      Itching   6. EXPOSURE:  \"How were you exposed to the plant (poison ivy, poison oak, sumac)\"  \"When were you exposed?\"        4 days ago   7. PAST HISTORY: \"Have you had a poison ivy rash before?\" If Yes, ask: \"How bad was it?\"      History of severe reaction to poison ivy which required steroid shot.   8. PREGNANCY: \"Is there any chance you are pregnant?\" \"When was your last menstrual period?\"      No    Protocols used: Poison Ivy - Oak - Sumac-Adult-[place]    "

## 2025-05-12 NOTE — PROGRESS NOTES
Name: Amber Boogie      : 2000      MRN: [id number]  Encounter Provider: Chrissy Armenta MD  Encounter Date: 2025   Encounter department: [facility]  :  Assessment & Plan  Rhus dermatitis  Steroid injection today. Continue Benadryl and calamine   Orders:    triamcinolone acetonide (KENALOG-40) 40 mg/mL injection 80 mg           History of Present Illness   Patient presents with:  Poison Julianne: Hand has used both wrist belly and under boob   Tight now asma   3 days ago        Poison Ivy      Review of Systems   Skin:  Positive for rash.       Objective   /62 (BP Location: Left arm, Patient Position: Sitting, Cuff Size: Standard)   Pulse 103   Temp 98.3 °F (36.8 °C) (Temporal)   Resp 16   Wt 53.1 kg (117 lb)   LMP 2025   SpO2 100%   BMI 21.40 kg/m²      Physical Exam  Skin:     Findings: Erythema and rash present.

## 2025-05-21 ENCOUNTER — OFFICE VISIT (OUTPATIENT)
Dept: FAMILY MEDICINE CLINIC | Facility: CLINIC | Age: 25
End: 2025-05-21
Payer: COMMERCIAL

## 2025-05-21 VITALS
HEART RATE: 97 BPM | WEIGHT: 114.5 LBS | DIASTOLIC BLOOD PRESSURE: 78 MMHG | SYSTOLIC BLOOD PRESSURE: 122 MMHG | RESPIRATION RATE: 16 BRPM | BODY MASS INDEX: 20.94 KG/M2 | OXYGEN SATURATION: 100 % | TEMPERATURE: 96.5 F

## 2025-05-21 DIAGNOSIS — F90.2 ATTENTION DEFICIT HYPERACTIVITY DISORDER (ADHD), COMBINED TYPE: Primary | ICD-10-CM

## 2025-05-21 PROCEDURE — 99213 OFFICE O/P EST LOW 20 MIN: CPT | Performed by: FAMILY MEDICINE

## 2025-05-21 NOTE — ASSESSMENT & PLAN NOTE
Patient is tolerating medication well.  Continue current dose of medication.  PDMP reviewed.  Controlled substance agreement on file.  Controlled substance agreement UTD in media.  Follow up in 3 months for medication check.

## 2025-05-21 NOTE — PROGRESS NOTES
Name: Amber Boogie      : 2000      MRN: [id number]  Encounter Provider: Chrissy Armenta MD  Encounter Date: 2025   Encounter department: [facility]ACT[facility]  :  Assessment & Plan  Attention deficit hyperactivity disorder (ADHD), combined type  Patient is tolerating medication well.  Continue current dose of medication.  PDMP reviewed.  Controlled substance agreement on file.  Controlled substance agreement UTD in media.  Follow up in 3 months for medication check.              History of Present Illness   Patient is today for controlled substance follow-up.  Tolerating Adderall well.  No side effects.      Review of Systems   Constitutional:  Negative for activity change, fatigue and fever.   Eyes:  Negative for visual disturbance.   Respiratory:  Negative for shortness of breath.    Cardiovascular:  Negative for chest pain.   Gastrointestinal:  Negative for abdominal pain, constipation, diarrhea and nausea.   Endocrine: Negative for cold intolerance and heat intolerance.   Musculoskeletal:  Negative for back pain.   Skin:  Negative for rash.   Neurological:  Negative for headaches.   Psychiatric/Behavioral:  Negative for confusion.        Objective   /78 (BP Location: Right arm, Patient Position: Sitting, Cuff Size: Standard)   Pulse 97   Temp (!) 96.5 °F (35.8 °C) (Temporal)   Resp 16   Wt 51.9 kg (114 lb 8 oz)   LMP 2025   SpO2 100%   BMI 20.94 kg/m²      Physical Exam  Vitals and nursing note reviewed.   Constitutional:       Appearance: Normal appearance. She is well-developed.   HENT:      Head: Normocephalic and atraumatic.     Cardiovascular:      Rate and Rhythm: Normal rate and regular rhythm.   Pulmonary:      Effort: Pulmonary effort is normal.      Breath sounds: Normal breath sounds.   Abdominal:      General: Bowel sounds are normal.      Palpations: Abdomen is soft.     Musculoskeletal:      Cervical back: Normal range of motion.     Skin:      General: Skin is warm.     Neurological:      General: No focal deficit present.      Mental Status: She is alert.     Psychiatric:         Mood and Affect: Mood normal.         Speech: Speech normal.

## 2025-06-03 DIAGNOSIS — F90.2 ATTENTION DEFICIT HYPERACTIVITY DISORDER (ADHD), COMBINED TYPE: ICD-10-CM

## 2025-06-03 NOTE — TELEPHONE ENCOUNTER
Patient calling for   Requested Prescriptions     Pending Prescriptions Disp Refills    amphetamine-dextroamphetamine (ADDERALL XR, 20MG,) 20 MG 24 hr capsule 30 capsule 0     Sig: Take 1 capsule (20 mg total) by mouth every morning Max Daily Amount: 20 mg     Has 4 pills left, please send to the [facility] [id number]

## 2025-06-04 RX ORDER — DEXTROAMPHETAMINE SACCHARATE, AMPHETAMINE ASPARTATE MONOHYDRATE, DEXTROAMPHETAMINE SULFATE AND AMPHETAMINE SULFATE 5; 5; 5; 5 MG/1; MG/1; MG/1; MG/1
20 CAPSULE, EXTENDED RELEASE ORAL EVERY MORNING
Qty: 30 CAPSULE | Refills: 0 | Status: SHIPPED | OUTPATIENT
Start: 2025-06-04 | End: 2025-07-04

## 2025-06-09 ENCOUNTER — OFFICE VISIT (OUTPATIENT)
Dept: FAMILY MEDICINE CLINIC | Facility: CLINIC | Age: 25
End: 2025-06-09
Payer: COMMERCIAL

## 2025-06-09 VITALS
SYSTOLIC BLOOD PRESSURE: 124 MMHG | TEMPERATURE: 97.6 F | OXYGEN SATURATION: 98 % | DIASTOLIC BLOOD PRESSURE: 86 MMHG | HEART RATE: 99 BPM | WEIGHT: 113 LBS | BODY MASS INDEX: 20.67 KG/M2 | RESPIRATION RATE: 16 BRPM

## 2025-06-09 DIAGNOSIS — L25.5 RHUS DERMATITIS: Primary | ICD-10-CM

## 2025-06-09 PROCEDURE — 96372 THER/PROPH/DIAG INJ SC/IM: CPT | Performed by: FAMILY MEDICINE

## 2025-06-09 PROCEDURE — 99213 OFFICE O/P EST LOW 20 MIN: CPT | Performed by: FAMILY MEDICINE

## 2025-06-09 RX ORDER — TRIAMCINOLONE ACETONIDE 40 MG/ML
80 INJECTION, SUSPENSION INTRA-ARTICULAR; INTRAMUSCULAR ONCE
Status: COMPLETED | OUTPATIENT
Start: 2025-06-09 | End: 2025-06-09

## 2025-06-09 RX ADMIN — TRIAMCINOLONE ACETONIDE 80 MG: 40 INJECTION, SUSPENSION INTRA-ARTICULAR; INTRAMUSCULAR at 10:49

## 2025-06-09 NOTE — PROGRESS NOTES
Name: Amber Boogie      : 2000      MRN: [id number]  Encounter Provider: Chrissy Armenta MD  Encounter Date: 2025   Encounter department: [facility] PR[facility]  :  Assessment & Plan  Rhus dermatitis  Extensive dermatitis suspect poison ivy versus oak.  Present on her legs abdomen chest and under her breast.  Using calamine lotion.  Steroid injection today  Orders:    triamcinolone acetonide (KENALOG-40) 40 mg/mL injection 80 mg           History of Present Illness   Patient presents with:  Poison Ivy: Thinks poison oak more red Thursday-Friday   Present on her breast chest abdomen and lower extremities.  Has been using calamine lotion and over-the-counter antihistamines without any relief.            Review of Systems   Skin:  Positive for color change and rash.       Objective   /86 (BP Location: Left arm, Patient Position: Sitting, Cuff Size: Standard)   Pulse 99   Temp 97.6 °F (36.4 °C) (Temporal)   Resp 16   Wt 51.3 kg (113 lb)   SpO2 98%   BMI 20.67 kg/m²      Physical Exam    Skin:     Findings: Erythema and rash present.

## 2025-07-07 ENCOUNTER — PATIENT MESSAGE (OUTPATIENT)
Dept: FAMILY MEDICINE CLINIC | Facility: CLINIC | Age: 25
End: 2025-07-07

## 2025-07-12 DIAGNOSIS — F90.2 ATTENTION DEFICIT HYPERACTIVITY DISORDER (ADHD), COMBINED TYPE: ICD-10-CM

## 2025-07-14 ENCOUNTER — OFFICE VISIT (OUTPATIENT)
Dept: FAMILY MEDICINE CLINIC | Facility: CLINIC | Age: 25
End: 2025-07-14
Payer: COMMERCIAL

## 2025-07-14 VITALS
RESPIRATION RATE: 17 BRPM | TEMPERATURE: 97.7 F | DIASTOLIC BLOOD PRESSURE: 78 MMHG | BODY MASS INDEX: 21.35 KG/M2 | OXYGEN SATURATION: 98 % | HEART RATE: 78 BPM | SYSTOLIC BLOOD PRESSURE: 124 MMHG | WEIGHT: 116 LBS | HEIGHT: 62 IN

## 2025-07-14 DIAGNOSIS — L25.5 RHUS DERMATITIS: Primary | ICD-10-CM

## 2025-07-14 PROCEDURE — 99213 OFFICE O/P EST LOW 20 MIN: CPT

## 2025-07-14 RX ORDER — DEXTROAMPHETAMINE SACCHARATE, AMPHETAMINE ASPARTATE MONOHYDRATE, DEXTROAMPHETAMINE SULFATE AND AMPHETAMINE SULFATE 5; 5; 5; 5 MG/1; MG/1; MG/1; MG/1
20 CAPSULE, EXTENDED RELEASE ORAL EVERY MORNING
Qty: 30 CAPSULE | Refills: 0 | Status: SHIPPED | OUTPATIENT
Start: 2025-07-14 | End: 2025-08-13

## 2025-08-17 DIAGNOSIS — F90.2 ATTENTION DEFICIT HYPERACTIVITY DISORDER (ADHD), COMBINED TYPE: ICD-10-CM

## 2025-08-18 RX ORDER — DEXTROAMPHETAMINE SACCHARATE, AMPHETAMINE ASPARTATE MONOHYDRATE, DEXTROAMPHETAMINE SULFATE AND AMPHETAMINE SULFATE 5; 5; 5; 5 MG/1; MG/1; MG/1; MG/1
20 CAPSULE, EXTENDED RELEASE ORAL EVERY MORNING
Qty: 30 CAPSULE | Refills: 0 | Status: SHIPPED | OUTPATIENT
Start: 2025-08-18 | End: 2025-08-21

## 2025-08-21 ENCOUNTER — OFFICE VISIT (OUTPATIENT)
Dept: FAMILY MEDICINE CLINIC | Facility: CLINIC | Age: 25
End: 2025-08-21
Payer: COMMERCIAL

## 2025-08-21 VITALS
WEIGHT: 113.5 LBS | DIASTOLIC BLOOD PRESSURE: 72 MMHG | HEART RATE: 118 BPM | OXYGEN SATURATION: 98 % | RESPIRATION RATE: 18 BRPM | BODY MASS INDEX: 20.89 KG/M2 | TEMPERATURE: 97.7 F | HEIGHT: 62 IN | SYSTOLIC BLOOD PRESSURE: 114 MMHG

## 2025-08-21 DIAGNOSIS — F90.2 ATTENTION DEFICIT HYPERACTIVITY DISORDER (ADHD), COMBINED TYPE: Primary | ICD-10-CM

## 2025-08-21 PROCEDURE — 99214 OFFICE O/P EST MOD 30 MIN: CPT | Performed by: FAMILY MEDICINE

## 2025-08-21 RX ORDER — DEXTROAMPHETAMINE SACCHARATE, AMPHETAMINE ASPARTATE MONOHYDRATE, DEXTROAMPHETAMINE SULFATE AND AMPHETAMINE SULFATE 7.5; 7.5; 7.5; 7.5 MG/1; MG/1; MG/1; MG/1
30 CAPSULE, EXTENDED RELEASE ORAL EVERY MORNING
Qty: 30 CAPSULE | Refills: 0 | Status: SHIPPED | OUTPATIENT
Start: 2025-09-18

## 2025-08-26 LAB
